# Patient Record
Sex: MALE | Race: WHITE | Employment: OTHER | URBAN - METROPOLITAN AREA
[De-identification: names, ages, dates, MRNs, and addresses within clinical notes are randomized per-mention and may not be internally consistent; named-entity substitution may affect disease eponyms.]

---

## 2017-03-21 ENCOUNTER — HOSPITAL ENCOUNTER (OUTPATIENT)
Dept: GENERAL RADIOLOGY | Age: 67
Discharge: HOME | End: 2017-03-21
Payer: COMMERCIAL

## 2017-03-21 DIAGNOSIS — R06.02 SHORTNESS OF BREATH: ICD-10-CM

## 2017-03-21 PROCEDURE — 71020 XR CHEST PA LAT: CPT

## 2018-01-12 ENCOUNTER — HOSPITAL ENCOUNTER (OUTPATIENT)
Dept: MRI IMAGING | Age: 68
Discharge: HOME | End: 2018-01-12
Attending: INTERNAL MEDICINE
Payer: COMMERCIAL

## 2018-01-12 DIAGNOSIS — Z80.0 FAMILY HISTORY OF GI TRACT CANCER: ICD-10-CM

## 2018-01-12 DIAGNOSIS — K86.89 DILATED PANCREATIC DUCT: ICD-10-CM

## 2018-01-12 DIAGNOSIS — K76.9 LIVER LESION: ICD-10-CM

## 2018-01-12 DIAGNOSIS — R19.4 CHANGE IN BOWEL HABITS: ICD-10-CM

## 2018-01-12 LAB — CREAT BLD-MCNC: 0.8 MG/DL (ref 0.6–1.3)

## 2018-01-12 PROCEDURE — 74183 MRI ABD W/O CNTR FLWD CNTR: CPT

## 2018-01-12 PROCEDURE — 82565 ASSAY OF CREATININE: CPT

## 2018-01-12 PROCEDURE — 74182 MRI ABDOMEN W/CONTRAST: CPT

## 2018-01-12 PROCEDURE — 74011250636 HC RX REV CODE- 250/636: Performed by: INTERNAL MEDICINE

## 2018-01-12 PROCEDURE — A9577 INJ MULTIHANCE: HCPCS | Performed by: INTERNAL MEDICINE

## 2018-01-12 RX ADMIN — GADOBENATE DIMEGLUMINE 20 ML: 529 INJECTION, SOLUTION INTRAVENOUS at 17:02

## 2018-02-06 ENCOUNTER — ANESTHESIA EVENT (OUTPATIENT)
Dept: ENDOSCOPY | Age: 68
End: 2018-02-06
Payer: MEDICARE

## 2018-02-06 RX ORDER — MYCOPHENOLATE MOFETIL 500 MG/1
1000 TABLET ORAL 2 TIMES DAILY
COMMUNITY

## 2018-02-06 NOTE — ANESTHESIA PREPROCEDURE EVALUATION
Anesthetic History   No history of anesthetic complications            Review of Systems / Medical History  Patient summary reviewed, nursing notes reviewed and pertinent labs reviewed    Pulmonary            Asthma        Neuro/Psych             Comments: Myasthenia Gravis Cardiovascular    Hypertension          Hyperlipidemia    Exercise tolerance: >4 METS     GI/Hepatic/Renal               Comments: Liver Lesion Endo/Other  Within defined limits           Other Findings            Physical Exam    Airway  Mallampati: I  TM Distance: 4 - 6 cm  Neck ROM: normal range of motion   Mouth opening: Normal     Cardiovascular  Regular rate and rhythm,  S1 and S2 normal,  no murmur, click, rub, or gallop             Dental    Dentition: Caps/crowns     Pulmonary  Breath sounds clear to auscultation               Abdominal  GI exam deferred       Other Findings            Anesthetic Plan    ASA: 3  Anesthesia type: total IV anesthesia          Induction: Intravenous  Anesthetic plan and risks discussed with: Patient

## 2018-02-07 ENCOUNTER — ANESTHESIA (OUTPATIENT)
Dept: ENDOSCOPY | Age: 68
End: 2018-02-07
Payer: MEDICARE

## 2018-02-07 ENCOUNTER — HOSPITAL ENCOUNTER (OUTPATIENT)
Age: 68
Setting detail: OUTPATIENT SURGERY
Discharge: HOME | End: 2018-02-07
Attending: INTERNAL MEDICINE | Admitting: INTERNAL MEDICINE
Payer: MEDICARE

## 2018-02-07 VITALS
WEIGHT: 244.13 LBS | SYSTOLIC BLOOD PRESSURE: 124 MMHG | DIASTOLIC BLOOD PRESSURE: 61 MMHG | BODY MASS INDEX: 32.36 KG/M2 | HEIGHT: 73 IN | RESPIRATION RATE: 12 BRPM | OXYGEN SATURATION: 98 % | TEMPERATURE: 98 F | HEART RATE: 71 BPM

## 2018-02-07 PROCEDURE — 74011000250 HC RX REV CODE- 250

## 2018-02-07 PROCEDURE — 76040000019: Performed by: INTERNAL MEDICINE

## 2018-02-07 PROCEDURE — 74011250636 HC RX REV CODE- 250/636

## 2018-02-07 PROCEDURE — 77030019957 HC CUF BLN GASTSCP OCOA -B: Performed by: INTERNAL MEDICINE

## 2018-02-07 PROCEDURE — 77030003406 HC NDL ASPIR BIOP OCOA -C: Performed by: INTERNAL MEDICINE

## 2018-02-07 PROCEDURE — 76060000031 HC ANESTHESIA FIRST 0.5 HR: Performed by: INTERNAL MEDICINE

## 2018-02-07 PROCEDURE — 74011250636 HC RX REV CODE- 250/636: Performed by: INTERNAL MEDICINE

## 2018-02-07 RX ORDER — EPINEPHRINE 0.1 MG/ML
1 INJECTION INTRACARDIAC; INTRAVENOUS
Status: DISCONTINUED | OUTPATIENT
Start: 2018-02-07 | End: 2018-02-07 | Stop reason: HOSPADM

## 2018-02-07 RX ORDER — FENTANYL CITRATE 50 UG/ML
25 INJECTION, SOLUTION INTRAMUSCULAR; INTRAVENOUS
Status: DISCONTINUED | OUTPATIENT
Start: 2018-02-07 | End: 2018-02-07 | Stop reason: HOSPADM

## 2018-02-07 RX ORDER — FLUMAZENIL 0.1 MG/ML
0.2 INJECTION INTRAVENOUS
Status: DISCONTINUED | OUTPATIENT
Start: 2018-02-07 | End: 2018-02-07 | Stop reason: HOSPADM

## 2018-02-07 RX ORDER — ATROPINE SULFATE 0.1 MG/ML
0.5 INJECTION INTRAVENOUS
Status: DISCONTINUED | OUTPATIENT
Start: 2018-02-07 | End: 2018-02-07 | Stop reason: HOSPADM

## 2018-02-07 RX ORDER — SODIUM CHLORIDE 0.9 % (FLUSH) 0.9 %
5-10 SYRINGE (ML) INJECTION EVERY 8 HOURS
Status: DISCONTINUED | OUTPATIENT
Start: 2018-02-07 | End: 2018-02-07 | Stop reason: HOSPADM

## 2018-02-07 RX ORDER — GLYCOPYRROLATE 0.2 MG/ML
INJECTION INTRAMUSCULAR; INTRAVENOUS AS NEEDED
Status: DISCONTINUED | OUTPATIENT
Start: 2018-02-07 | End: 2018-02-07 | Stop reason: HOSPADM

## 2018-02-07 RX ORDER — SODIUM CHLORIDE 0.9 % (FLUSH) 0.9 %
5-10 SYRINGE (ML) INJECTION AS NEEDED
Status: DISCONTINUED | OUTPATIENT
Start: 2018-02-07 | End: 2018-02-07 | Stop reason: HOSPADM

## 2018-02-07 RX ORDER — MIDAZOLAM HYDROCHLORIDE 1 MG/ML
.25-5 INJECTION, SOLUTION INTRAMUSCULAR; INTRAVENOUS
Status: DISCONTINUED | OUTPATIENT
Start: 2018-02-07 | End: 2018-02-07 | Stop reason: HOSPADM

## 2018-02-07 RX ORDER — NALOXONE HYDROCHLORIDE 0.4 MG/ML
0.4 INJECTION, SOLUTION INTRAMUSCULAR; INTRAVENOUS; SUBCUTANEOUS
Status: DISCONTINUED | OUTPATIENT
Start: 2018-02-07 | End: 2018-02-07 | Stop reason: HOSPADM

## 2018-02-07 RX ORDER — MIDAZOLAM HYDROCHLORIDE 1 MG/ML
1-3 INJECTION, SOLUTION INTRAMUSCULAR; INTRAVENOUS
Status: DISCONTINUED | OUTPATIENT
Start: 2018-02-07 | End: 2018-02-07 | Stop reason: HOSPADM

## 2018-02-07 RX ORDER — PROPOFOL 10 MG/ML
INJECTION, EMULSION INTRAVENOUS AS NEEDED
Status: DISCONTINUED | OUTPATIENT
Start: 2018-02-07 | End: 2018-02-07 | Stop reason: HOSPADM

## 2018-02-07 RX ORDER — DEXTROMETHORPHAN/PSEUDOEPHED 2.5-7.5/.8
1.2 DROPS ORAL
Status: DISCONTINUED | OUTPATIENT
Start: 2018-02-07 | End: 2018-02-07 | Stop reason: HOSPADM

## 2018-02-07 RX ORDER — SODIUM CHLORIDE 9 MG/ML
75 INJECTION, SOLUTION INTRAVENOUS CONTINUOUS
Status: DISCONTINUED | OUTPATIENT
Start: 2018-02-07 | End: 2018-02-07 | Stop reason: HOSPADM

## 2018-02-07 RX ORDER — LIDOCAINE HYDROCHLORIDE 20 MG/ML
INJECTION, SOLUTION EPIDURAL; INFILTRATION; INTRACAUDAL; PERINEURAL AS NEEDED
Status: DISCONTINUED | OUTPATIENT
Start: 2018-02-07 | End: 2018-02-07 | Stop reason: HOSPADM

## 2018-02-07 RX ADMIN — PROPOFOL 500 MG: 10 INJECTION, EMULSION INTRAVENOUS at 14:50

## 2018-02-07 RX ADMIN — LIDOCAINE HYDROCHLORIDE 100 MG: 20 INJECTION, SOLUTION EPIDURAL; INFILTRATION; INTRACAUDAL; PERINEURAL at 14:29

## 2018-02-07 RX ADMIN — GLYCOPYRROLATE 0.2 MG: 0.2 INJECTION INTRAMUSCULAR; INTRAVENOUS at 14:29

## 2018-02-07 RX ADMIN — SODIUM CHLORIDE 75 ML/HR: 900 INJECTION, SOLUTION INTRAVENOUS at 13:50

## 2018-02-07 NOTE — DISCHARGE INSTRUCTIONS
Nicole Bolaños  [id number]  1950    EUS DISCHARGE INSTRUCTIONS  Discomfort:  Sore throat- throat lozenges or warm salt water gargle  redness at IV site- apply warm compress to area; if redness or soreness persist- contact your physician  Gaseous discomfort- walking, belching will help relieve any discomfort  You may not operate a vehicle for 12 hours  You may not engage in an occupation involving machinery or appliances for rest of today  You may not drink alcoholic beverages for at least 12 hours  Avoid making any critical decisions for at least 24 hour  DIET  You may have minimal sips at this time-- do not eat or drink for two hours. You may eat and drink after 315pm today  You may resume your regular diet - however -  remember your colon is empty and a heavy meal will produce gas. Avoid these foods:  vegetables, fried / greasy foods, carbonated drinks    MEDICATIONS:        ACTIVITY  You may resume your normal daily activities until tomorrow AM;  Spend the remainder of the day resting -  avoid any strenuous activity. CALL M.D. ANY SIGN OF   Increasing pain, nausea, vomiting  Abdominal distension (swelling)  New increased bleeding (oral or rectal)  Fever (chills)  Pain in chest area  Bloody discharge from nose or mouth  Shortness of breath    IMPRESSION:  -Pancreas divisum,   -No mass noted in pancreas  -Mild pancreatic duct dilatation in distal body and tail of pancreas from 4-5mm with dilated side branches but no associated mass noted proximally or distally. -Main pancreatic duct measures 3-4mm in the head of the pancreas with focal area of secondary ductule cystic dilatation suggestive of side branch dilatation, but not associated with main branch or mass lession. No noted stricture.   The appearance is consistent with a secondary branch (side branch) IPMN    Follow-up Instructions:  -Call Dr. Venu Edward if questions arise regarding your procedure  -Telephone # [phone]  -Repeat MRI imaging in 6-12 months to assess for any change will be coordinated by our office.     Chago Block MD

## 2018-02-07 NOTE — H&P
Gastroenterology Outpatient History and Physical    Patient: Trent Bound    Physician: Jose Gil MD    Chief Complaint: Abnl GI x-ray with pancreatic cyst  History of Present Illness: 74yo M with Abnl GI x-ray with pancreatic cyst.  Pancreatic tail calcification and minimal ductal dilatation. EUS in past with pancreatic divisum. No cyst noted then. History:  Past Medical History:   Diagnosis Date    Adverse effect of anesthesia     have to be particular with type of anesthesia d/t myasthenia gravis    Asthma     Elevated cholesterol     Hypertension     Myasthenia gravis ([address]     Seasonal allergic rhinitis       Past Surgical History:   Procedure Laterality Date    HX COLONOSCOPY      HX HERNIA REPAIR      HX ORTHOPAEDIC Right     RTC     HX ORTHOPAEDIC      tavo knee replacement    HX TONSILLECTOMY      [facility] EDG US EXAM SURGICAL ALTER STOM DUODENUM/JEJUNUM  12/20/2016           Social History     Social History    Marital status:      Spouse name: N/A    Number of children: N/A    Years of education: N/A     Social History Main Topics    Smoking status: Never Smoker    Smokeless tobacco: Never Used    Alcohol use 0.6 - 1.2 oz/week     1 - 2 Glasses of wine per week    Drug use: No    Sexual activity: Not Asked     Other Topics Concern    None     Social History Narrative      Family History   Problem Relation Age of Onset    Cancer Mother      colon    Lung Disease Father      mesothelioma    Heart Disease Father     Diabetes Sister     Colon Cancer Maternal Uncle     There is no problem list on file for this patient. Allergies: Allergies   Allergen Reactions    Tape [Adhesive] Rash     Medications:   Prior to Admission medications    Medication Sig Start Date End Date Taking? Authorizing Provider   mycophenolate (CELLCEPT) 500 mg tablet Take 1,000 mg by mouth two (2) times a day.    Yes Historical Provider   lisinopril (PRINIVIL, ZESTRIL) 10 mg tablet Take 10 mg by mouth daily. Yes Historical Provider   hydroCHLOROthiazide (HYDRODIURIL) 25 mg tablet Take 25 mg by mouth daily. Yes Historical Provider   atorvastatin (LIPITOR) 10 mg tablet Take 10 mg by mouth daily. Yes Historical Provider   Cetirizine (ZYRTEC) 10 mg cap Take  by mouth daily. Yes Historical Provider   benzonatate (TESSALON) 200 mg capsule Take 200 mg by mouth three (3) times daily as needed for Cough. Yes Historical Provider   predniSONE (DELTASONE) 10 mg tablet Take 10 mg by mouth daily (with breakfast). Yes Historical Provider   azelastine-fluticasone (DYMISTA) 137-50 mcg/spray spry by Nasal route as needed. Historical Provider   budesonide (PULMICORT FLEXHALER) 90 mcg/actuation aepb inhaler Take  by inhalation two (2) times a day. Historical Provider   albuterol (VENTOLIN HFA) 90 mcg/actuation inhaler Take 2 Puffs by inhalation every four (4) hours as needed. Historical Provider     Physical Exam:   Vital Signs: Blood pressure 126/86, pulse 74, temperature 98.4 °F (36.9 °C), resp. rate 18, height 6' 1\" (1.854 m), weight 110.7 kg (244 lb 2 oz), SpO2 98 %.   General: well developed, well nourished   HEENT: unremarkable   Heart: regular rhythm no mumur    Lungs: clear   Abdominal:  benign   Neurological: unremarkable   Extremities: no edema     Findings/Diagnosis: Abnl GI x-ray with pancreatic cyst  Plan of Care/Planned Procedure: EUS-FNA with conscious/deep sedation    Signed:  Bo Giraldo MD 2/7/2018

## 2018-02-07 NOTE — PERIOP NOTES
Anesthesia reports 500mg Propofol, 100mg Lidocaine and 0.2mg Robinul 500mL NS given during procedure. Received report from anesthesia staff on vital signs and status of patient.

## 2018-02-07 NOTE — IP AVS SNAPSHOT
[address] [facility] 
[phone] Patient: Roman Brown 
MRN: [id number] MBG:3/30/1714 About your hospitalization You were admitted on:  February 7, 2018 You last received care in the:  \[facility]\"" ENDOSCOPY You were discharged on:  February 7, 2018 Why you were hospitalized Your primary diagnosis was:  Not on File Follow-up Information None Your Scheduled Appointments Wednesday March 07, 2018 COLONOSCOPY with MD SANDEE Ruby ENDOSCOPY (RI OR PRE ASSESSMENT) [address] [facility]  
[phone] Discharge Orders None A check lilian indicates which time of day the medication should be taken. My Medications CONTINUE taking these medications Instructions Each Dose to Equal  
 Morning Noon Evening Bedtime  
 benzonatate 200 mg capsule Commonly known as:  TESSALON Your last dose was: Your next dose is: Take 200 mg by mouth three (3) times daily as needed for Cough. 200 mg CELLCEPT 500 mg tablet Generic drug:  mycophenolate Your last dose was: Your next dose is: Take 1,000 mg by mouth two (2) times a day. 1000 mg DYMISTA 137-50 mcg/spray [place] Generic drug:  azelastine-fluticasone Your last dose was: Your next dose is:    
   
   
 by Nasal route as needed. hydroCHLOROthiazide 25 mg tablet Commonly known as:  HYDRODIURIL Your last dose was: Your next dose is: Take 25 mg by mouth daily. 25 mg  
    
   
   
   
  
 LIPITOR 10 mg tablet Generic drug:  atorvastatin Your last dose was: Your next dose is: Take 10 mg by mouth daily. 10 mg  
    
   
   
   
  
 lisinopril 10 mg tablet Commonly known as:  Sue Anand  
 Your last dose was: Your next dose is: Take 10 mg by mouth daily. 10 mg  
    
   
   
   
  
 predniSONE 10 mg tablet Commonly known as:  Daren Lard Your last dose was: Your next dose is: Take 10 mg by mouth daily (with breakfast). 10 mg PULMICORT FLEXHALER 90 mcg/actuation Aepb inhaler Generic drug:  budesonide Your last dose was: Your next dose is: Take  by inhalation two (2) times a day. VENTOLIN HFA 90 mcg/actuation inhaler Generic drug:  albuterol Your last dose was: Your next dose is: Take 2 Puffs by inhalation every four (4) hours as needed. 2 Puff ZyrTEC 10 mg Cap Generic drug:  Cetirizine Your last dose was: Your next dose is: Take  by mouth daily. Discharge Instructions Jacob Rangel 
[id number] 1950 EUS DISCHARGE INSTRUCTIONS Discomfort: 
Sore throat- throat lozenges or warm salt water gargle 
redness at IV site- apply warm compress to area; if redness or soreness persist- contact your physician Gaseous discomfort- walking, belching will help relieve any discomfort You may not operate a vehicle for 12 hours You may not engage in an occupation involving machinery or appliances for rest of today You may not drink alcoholic beverages for at least 12 hours Avoid making any critical decisions for at least 24 hour DIET You may have minimal sips at this time-- do not eat or drink for two hours. You may eat and drink after 315pm today You may resume your regular diet  however -  remember your colon is empty and a heavy meal will produce gas. Avoid these foods:  vegetables, fried / greasy foods, carbonated drinks MEDICATIONS: 
 
 
 
ACTIVITY You may resume your normal daily activities until tomorrow AM; 
 Spend the remainder of the day resting -  avoid any strenuous activity. CALL M.D. ANY SIGN OF Increasing pain, nausea, vomiting Abdominal distension (swelling) New increased bleeding (oral or rectal) Fever (chills) Pain in chest area Bloody discharge from nose or mouth Shortness of breath IMPRESSION: 
-Pancreas divisum,  
-No mass noted in pancreas 
-Mild pancreatic duct dilatation in distal body and tail of pancreas from 4-5mm with dilated side branches but no associated mass noted proximally or distally. -Main pancreatic duct measures 3-4mm in the head of the pancreas with focal area of secondary ductule cystic dilatation suggestive of side branch dilatation, but not associated with main branch or mass lession. No noted stricture. The appearance is consistent with a secondary branch (side branch) IPMN Follow-up Instructions: 
-Call Dr. Pablito Gomez if questions arise regarding your procedure 
-Telephone # [phone] 
-Repeat MRI imaging in 6-12 months to assess for any change will be coordinated by our office. Love Diaz MD 
 
  
 
  
  
  
Introducing [facility] SERVICES! Dear Dorothy Saunders: Thank you for requesting a [employer] account. Our records indicate that you already have an active [employer] account. You can access your account anytime at [email or web address] Did you know that you can access your hospital and ER discharge instructions at any time in [employer]? You can also review all of your test results from your hospital stay or ER visit. Additional Information If you have questions, please visit the Frequently Asked Questions section of the [employer] website at [email or web address] Remember, [employer] is NOT to be used for urgent needs. For medical emergencies, dial 911. Now available from your iPhone and Android! Providers Seen During Your Hospitalization Provider Specialty Primary office phone Harmony Faulkner MD Gastroenterology [phone] Your Primary Care Physician (PCP) Primary Care Physician Office Phone Office Fax [address] [phone] You are allergic to the following Allergen Reactions Tape (Adhesive) Rash Recent Documentation Height Weight BMI Smoking Status 1.854 m 110.7 kg 32.21 kg/m2 Never Smoker Emergency Contacts Name Discharge Info Relation Home Work Mobile Kathrin Martines DISCHARGE CAREGIVER [3] Spouse [3] [phone] Patient Belongings The following personal items are in your possession at time of discharge: 
  Dental Appliances: None  Visual Aid: Glasses Please provide this summary of care documentation to your next provider. Signatures-by signing, you are acknowledging that this After Visit Summary has been reviewed with you and you have received a copy. Patient Signature:  ____________________________________________________________ Date:  ____________________________________________________________  
  
Tayla Ramírez Provider Signature:  ____________________________________________________________ Date:  ____________________________________________________________

## 2018-02-07 NOTE — ANESTHESIA POSTPROCEDURE EVALUATION
Post-Anesthesia Evaluation and Assessment    Patient: Christiano Busby MRN: [id number]  SSN: [id number]    YOB: 1950  Age: 78 y.o. Sex: male       Cardiovascular Function/Vital Signs  Visit Vitals    /61    Pulse 71    Temp 36.7 °C (98 °F)    Resp 12    Ht 6' 1\" (1.854 m)    Wt 110.7 kg (244 lb 2 oz)    SpO2 98%    BMI 32.21 kg/m2       Patient is status post total IV anesthesia anesthesia for Procedure(s):  ENDOSCOPIC ULTRASOUND (EUS). Nausea/Vomiting: None    Postoperative hydration reviewed and adequate. Pain:  Pain Scale 1: Numeric (0 - 10) (02/07/18 1519)  Pain Intensity 1: 0 (02/07/18 1519)   Managed    Neurological Status: At baseline    Mental Status and Level of Consciousness: Arousable    Pulmonary Status:   O2 Device: Room air (02/07/18 1519)   Adequate oxygenation and airway patent    Complications related to anesthesia: None    Post-anesthesia assessment completed.  No concerns    Signed By: Bashir Knowles DO     February 7, 2018

## 2018-02-07 NOTE — PROCEDURES
NAME:  Celso Purvis   :   1950   MRN:   [id number]     [facility]    Date/Time:  2018   Procedure Type: EUS (radial and linear)    Indications: Abnormal CT scan showing pancreatic duct abnormality     Pre-operative Diagnosis: see indication above  Post-operative Diagnosis:  See findings below     Primary GI: Nikunj Martinez MD  : Yun Cordero MD  Referring Provider:      Erica Purvis MD     Procedure Details:    Exam:  Airway: clear, no airway problems anticipated  Heart: RRR, without gallops or rubs  Lungs: clear bilaterally without wheezes, crackles, or rhonchi  Abdomen: soft, nontender, nondistended, bowel sounds present  Mental Status: awake, alert and oriented to person, place and time      Anethesia/Sedation:  MAC anesthesia Propofol 500mg IV    Procedure Details   After infom consent was obtained for the procedure, with all risks and benefits of procedure explained the patient was taken to the endoscopy suite and placed in the left lateral decubitus position. Following sequential administration of sedation as per above, the radial, followed by th linear, echoendoscope was inserted into the mouth and advanced under direct vision to second portion of the duodenum. A careful inspection was made as the gastroscope was withdrawn, including a retroflexed view of the proximal stomach; findings and interventions are described below.                   Findings:      Endoscopic:-normal esophagus, stomach, and duodenum     Ultrasound:                        Esophagus: normal findings                        Stomach: normal findings                        Pancreas:                                               Areas examined: the entire gland                                              Parenchyma: -pancreas divisum, -no mass noted in pancreas                                              Pancreatic Duct: mild pancreatic duct dilatation in distal body and tail of pancreas from 4-5mm with dilated side branches with no associated mass noted proximally or distally. The pancreatic duct is 3-4mm in the head of the pancreas with focal area of secondary ductule cystic dilatation suggestive of side branch dilatation as not associated with main branch or mass lession. No noted with stricture. Bile Duct: normal common bile duct                        Lymph Node: no adenopathy          Specimen Removed:  None  Complications: None. EBL:  None. Interventions: none     Impressions:   -Pancreas divisum,   -No mass noted in pancreas  -Mild pancreatic duct dilatation in distal body and tail of pancreas from 4-5mm with dilated side branches but no associated mass noted proximally or distally. -Main pancreatic duct measures 3-4mm in the head of the pancreas with focal area of secondary ductule cystic dilatation suggestive of side branch dilatation, but not associated with main branch or mass lession. No noted stricture. The appearance is consistent with a secondary branch (side branch) IPMN     Recommendations: Repeat MRI imaging in 6-12 months to assess for any change will be coordinated by our office.     Elise Ferrer MD

## 2018-02-07 NOTE — ROUTINE PROCESS
Roman Brown  1950  [id number]    Situation:  Verbal report received from: Tash Yeung RN  Procedure: Procedure(s):  ENDOSCOPIC ULTRASOUND (EUS)    Background:    Preoperative diagnosis: LIVER LESION  Postoperative diagnosis: Abnormal CT    :  Dr. Maddy Coe  Assistant(s): Endoscopy Technician-1: Georgi Rodriguez  Endoscopy RN-1: Nobie Habermann, RN  Endoscopy RN-2: Hassan Goodell, RN    Specimens: * No specimens in log *  H. Pylori  no    Assessment:  Intra-procedure medications       Anesthesia gave intra-procedure sedation and medications, see anesthesia flow sheet yes    Intravenous fluids: NS@ KVO     Vital signs stable       Abdominal assessment: round and soft       Recommendation:  Discharge patient per MD order  .     Family or Friend  Rohini Roberts WIfe  Permission to share finding with family or friend yes

## 2018-03-07 ENCOUNTER — ANESTHESIA (OUTPATIENT)
Dept: ENDOSCOPY | Age: 68
End: 2018-03-07
Payer: MEDICARE

## 2018-03-07 ENCOUNTER — HOSPITAL ENCOUNTER (OUTPATIENT)
Age: 68
Setting detail: OUTPATIENT SURGERY
Discharge: HOME | End: 2018-03-07
Attending: INTERNAL MEDICINE | Admitting: INTERNAL MEDICINE
Payer: MEDICARE

## 2018-03-07 ENCOUNTER — ANESTHESIA EVENT (OUTPATIENT)
Dept: ENDOSCOPY | Age: 68
End: 2018-03-07
Payer: MEDICARE

## 2018-03-07 VITALS
HEART RATE: 64 BPM | BODY MASS INDEX: 32.6 KG/M2 | DIASTOLIC BLOOD PRESSURE: 74 MMHG | HEIGHT: 73 IN | WEIGHT: 246 LBS | SYSTOLIC BLOOD PRESSURE: 123 MMHG | TEMPERATURE: 97.7 F | RESPIRATION RATE: 11 BRPM | OXYGEN SATURATION: 99 %

## 2018-03-07 PROCEDURE — 74011250637 HC RX REV CODE- 250/637: Performed by: ANESTHESIOLOGY

## 2018-03-07 PROCEDURE — 76040000019: Performed by: INTERNAL MEDICINE

## 2018-03-07 PROCEDURE — 77030009426 HC FCPS BIOP ENDOSC BSC -B: Performed by: INTERNAL MEDICINE

## 2018-03-07 PROCEDURE — 88305 TISSUE EXAM BY PATHOLOGIST: CPT | Performed by: INTERNAL MEDICINE

## 2018-03-07 PROCEDURE — 74011250636 HC RX REV CODE- 250/636: Performed by: INTERNAL MEDICINE

## 2018-03-07 PROCEDURE — 74011000250 HC RX REV CODE- 250

## 2018-03-07 PROCEDURE — 76060000031 HC ANESTHESIA FIRST 0.5 HR: Performed by: INTERNAL MEDICINE

## 2018-03-07 PROCEDURE — 74011250636 HC RX REV CODE- 250/636

## 2018-03-07 PROCEDURE — 77030010936 HC CLP LIG BSC -C: Performed by: INTERNAL MEDICINE

## 2018-03-07 RX ORDER — NALOXONE HYDROCHLORIDE 0.4 MG/ML
0.4 INJECTION, SOLUTION INTRAMUSCULAR; INTRAVENOUS; SUBCUTANEOUS
Status: DISCONTINUED | OUTPATIENT
Start: 2018-03-07 | End: 2018-03-07 | Stop reason: HOSPADM

## 2018-03-07 RX ORDER — SODIUM CHLORIDE 0.9 % (FLUSH) 0.9 %
5-10 SYRINGE (ML) INJECTION EVERY 8 HOURS
Status: DISCONTINUED | OUTPATIENT
Start: 2018-03-07 | End: 2018-03-07 | Stop reason: HOSPADM

## 2018-03-07 RX ORDER — SODIUM CHLORIDE 0.9 % (FLUSH) 0.9 %
5-10 SYRINGE (ML) INJECTION AS NEEDED
Status: DISCONTINUED | OUTPATIENT
Start: 2018-03-07 | End: 2018-03-07 | Stop reason: HOSPADM

## 2018-03-07 RX ORDER — EPINEPHRINE 0.1 MG/ML
1 INJECTION INTRACARDIAC; INTRAVENOUS
Status: DISCONTINUED | OUTPATIENT
Start: 2018-03-07 | End: 2018-03-07 | Stop reason: HOSPADM

## 2018-03-07 RX ORDER — LIDOCAINE HYDROCHLORIDE 20 MG/ML
INJECTION, SOLUTION EPIDURAL; INFILTRATION; INTRACAUDAL; PERINEURAL AS NEEDED
Status: DISCONTINUED | OUTPATIENT
Start: 2018-03-07 | End: 2018-03-07 | Stop reason: HOSPADM

## 2018-03-07 RX ORDER — SODIUM CHLORIDE 9 MG/ML
100 INJECTION, SOLUTION INTRAVENOUS CONTINUOUS
Status: DISCONTINUED | OUTPATIENT
Start: 2018-03-07 | End: 2018-03-07 | Stop reason: HOSPADM

## 2018-03-07 RX ORDER — FLUMAZENIL 0.1 MG/ML
0.2 INJECTION INTRAVENOUS
Status: DISCONTINUED | OUTPATIENT
Start: 2018-03-07 | End: 2018-03-07 | Stop reason: HOSPADM

## 2018-03-07 RX ORDER — ATROPINE SULFATE 0.1 MG/ML
0.5 INJECTION INTRAVENOUS
Status: DISCONTINUED | OUTPATIENT
Start: 2018-03-07 | End: 2018-03-07 | Stop reason: HOSPADM

## 2018-03-07 RX ORDER — DEXTROMETHORPHAN/PSEUDOEPHED 2.5-7.5/.8
1.2 DROPS ORAL
Status: DISCONTINUED | OUTPATIENT
Start: 2018-03-07 | End: 2018-03-07 | Stop reason: HOSPADM

## 2018-03-07 RX ORDER — PROPOFOL 10 MG/ML
INJECTION, EMULSION INTRAVENOUS AS NEEDED
Status: DISCONTINUED | OUTPATIENT
Start: 2018-03-07 | End: 2018-03-07 | Stop reason: HOSPADM

## 2018-03-07 RX ORDER — MIDAZOLAM HYDROCHLORIDE 1 MG/ML
1-3 INJECTION, SOLUTION INTRAMUSCULAR; INTRAVENOUS
Status: DISCONTINUED | OUTPATIENT
Start: 2018-03-07 | End: 2018-03-07 | Stop reason: HOSPADM

## 2018-03-07 RX ADMIN — PROPOFOL 150 MG: 10 INJECTION, EMULSION INTRAVENOUS at 11:50

## 2018-03-07 RX ADMIN — SODIUM CHLORIDE 100 ML/HR: 900 INJECTION, SOLUTION INTRAVENOUS at 11:18

## 2018-03-07 RX ADMIN — SIMETHICONE 80 MG: 20 SUSPENSION/ DROPS ORAL at 11:42

## 2018-03-07 RX ADMIN — LIDOCAINE HYDROCHLORIDE 40 MG: 20 INJECTION, SOLUTION EPIDURAL; INFILTRATION; INTRACAUDAL; PERINEURAL at 11:33

## 2018-03-07 NOTE — DISCHARGE INSTRUCTIONS
Jake Rashid MD  [facility], [address]  [place], [address] [facility]  [phone]  [email or web address]    Christiano Busby  [id number]  1950    COLON DISCHARGE INSTRUCTIONS  Discomfort:  Redness at IV site- apply warm compress to area; if redness or soreness persist- contact your physician  There may be a slight amount of blood passed from the rectum  Gaseous discomfort- walking, belching will help relieve any discomfort  You may not operate a vehicle for 12 hours  You may not engage in an occupation involving machinery or appliances for rest of today  You may not drink alcoholic beverages for at least 12 hours  Avoid making any critical decisions for at least 24 hour  DIET:   Regular diet. - however -  remember your colon is empty and a heavy meal will produce gas. Avoid these foods:  vegetables, fried / greasy foods, carbonated drinks for today      ACTIVITY:  You may resume your normal daily activities it is recommended that you spend the remainder of the day resting -  avoid any strenuous activity. CALL M.D. ANY SIGN OF:   Increasing pain, nausea, vomiting  Abdominal distension (swelling)  New increased bleeding (oral or rectal)  Fever (chills)  Pain in chest area  Bloody discharge from nose or mouth  Shortness of breath     COLONOSCOPY FINDINGS:  Your colonoscopy showed: NO polyps found. Do have diverticulosis and internal hemorrhoids. Follow-up Instructions:   Call Dr. Jake Rashid if any questions or problems. Telephone # [phone]  Dr. Rodríguez [facility] office will notify you of the biopsy results within 7 to 10 days. Should have a repeat colonoscopy in 1 year. [employer] Activation    Thank you for requesting access to [employer]. Please follow the instructions below to securely access and download your online medical record.  [employer] allows you to send messages to your doctor, view your test results, renew your prescriptions, schedule appointments, and more. How Do I Sign Up? 1. In your internet browser, go to [email or web address]  2. Click on the First Time User? Click Here link in the Sign In box. You will be redirect to the New Member Sign Up page. 3. Enter your [employer] People Access Code exactly as it appears below. You will not need to use this code after youve completed the sign-up process. If you do not sign up before the expiration date, you must request a new code. MyChart Access Code: Activation code not generated  Current [employer] People Status: Active (This is the date your "[employer]t access code will )    4. Enter the last four digits of your Social Security Number (xxxx) and Date of Birth (mm/dd/yyyy) as indicated and click Submit. You will be taken to the next sign-up page. 5. Create a Good People ID. This will be your [employer] People login ID and cannot be changed, so think of one that is secure and easy to remember. 6. Create a [employer] People password. You can change your password at any time. 7. Enter your Password Reset Question and Answer. This can be used at a later time if you forget your password. 8. Enter your e-mail address. You will receive e-mail notification when new information is available in [address] 9. Click Sign Up. You can now view and download portions of your medical record. 10. Click the Download Summary menu link to download a portable copy of your medical information. Additional Information    If you have questions, please visit the Frequently Asked Questions section of the [employer] website at [email or web address] Remember, [employer] is NOT to be used for urgent needs. For medical emergencies, dial 911.

## 2018-03-07 NOTE — ANESTHESIA POSTPROCEDURE EVALUATION
Post-Anesthesia Evaluation and Assessment    Patient: Annie Ocasio MRN: [id number]  SSN: [id number]    YOB: 1950  Age: 78 y.o. Sex: male       Cardiovascular Function/Vital Signs  Visit Vitals    /75    Pulse 68    Temp 36.5 °C (97.7 °F)    Resp 16    Ht 6' 1\" (1.854 m)    Wt 111.6 kg (246 lb)    SpO2 98%    BMI 32.46 kg/m2       Patient is status post general, total IV anesthesia anesthesia for Procedure(s):  COLONOSCOPY  COLON BIOPSY  RESOLUTION CLIP. Nausea/Vomiting: None    Postoperative hydration reviewed and adequate. Pain:  Pain Scale 1: Numeric (0 - 10) (03/07/18 1200)  Pain Intensity 1: 0 (03/07/18 1200)   Managed    Neurological Status: At baseline    Mental Status and Level of Consciousness: Arousable    Pulmonary Status:   O2 Device: Room air (03/07/18 1200)   Adequate oxygenation and airway patent    Complications related to anesthesia: None    Post-anesthesia assessment completed.  No concerns    Signed By: Hermelinda Salazar MD     March 7, 2018

## 2018-03-07 NOTE — IP AVS SNAPSHOT
[address] [facility] 
[phone] Patient: Luz Kolb 
MRN: [id number] PII:[id number] About your hospitalization You were admitted on:  March 7, 2018 You last received care in the:  [facility] ENDOSCOPY You were discharged on:  March 7, 2018 Why you were hospitalized Your primary diagnosis was:  Not on File Follow-up Information Follow up With Details Comments Contact Info Raz Roche MD   [address] [place] [address] [address] [facility] 
[phone] Discharge Orders None A check lilian indicates which time of day the medication should be taken. My Medications CONTINUE taking these medications Instructions Each Dose to Equal  
 Morning Noon Evening Bedtime  
 benzonatate 200 mg capsule Commonly known as:  TESSALON Your last dose was: Your next dose is: Take 200 mg by mouth three (3) times daily as needed for Cough. 200 mg CELLCEPT 500 mg tablet Generic drug:  mycophenolate Your last dose was: Your next dose is: Take 1,000 mg by mouth two (2) times a day. 1000 mg DYMISTA 137-50 mcg/spray [place] Generic drug:  azelastine-fluticasone Your last dose was: Your next dose is:    
   
   
 by Nasal route as needed. hydroCHLOROthiazide 25 mg tablet Commonly known as:  HYDRODIURIL Your last dose was: Your next dose is: Take 25 mg by mouth daily. 25 mg  
    
   
   
   
  
 LIPITOR 10 mg tablet Generic drug:  atorvastatin Your last dose was: Your next dose is: Take 10 mg by mouth daily. 10 mg  
    
   
   
   
  
 lisinopril 10 mg tablet Commonly known as:  Ritesh Valenzuela Your last dose was: Your next dose is: Take 10 mg by mouth daily. 10 mg  
    
   
   
   
  
 predniSONE 10 mg tablet Commonly known as:  Soraida Hart Your last dose was: Your next dose is: Take 10 mg by mouth daily (with breakfast). 10 mg PULMICORT FLEXHALER 90 mcg/actuation Aepb inhaler Generic drug:  budesonide Your last dose was: Your next dose is: Take  by inhalation two (2) times a day. VENTOLIN HFA 90 mcg/actuation inhaler Generic drug:  albuterol Your last dose was: Your next dose is: Take 2 Puffs by inhalation every four (4) hours as needed. 2 Puff ZyrTEC 10 mg Cap Generic drug:  Cetirizine Your last dose was: Your next dose is: Take  by mouth daily. Discharge Instructions Nicole Villafuerte MD 
[facility], [address] [place] [id number] 
[email or web address] Slim Wright 
[id number] 1950 COLON DISCHARGE INSTRUCTIONS Discomfort: 
Redness at IV site- apply warm compress to area; if redness or soreness persist- contact your physician There may be a slight amount of blood passed from the rectum Gaseous discomfort- walking, belching will help relieve any discomfort You may not operate a vehicle for 12 hours You may not engage in an occupation involving machinery or appliances for rest of today You may not drink alcoholic beverages for at least 12 hours Avoid making any critical decisions for at least 24 hour DIET: 
 Regular diet.  however -  remember your colon is empty and a heavy meal will produce gas. Avoid these foods:  vegetables, fried / greasy foods, carbonated drinks for today ACTIVITY: 
You may resume your normal daily activities it is recommended that you spend the remainder of the day resting -  avoid any strenuous activity. CALL M.D. ANY SIGN OF: Increasing pain, nausea, vomiting Abdominal distension (swelling) New increased bleeding (oral or rectal) Fever (chills) Pain in chest area Bloody discharge from nose or mouth Shortness of breath COLONOSCOPY FINDINGS: 
Your colonoscopy showed: NO polyps found. Do have diverticulosis and internal hemorrhoids. Follow-up Instructions: 
 Call Dr. April Valdez if any questions or problems. Telephone # [phone] Dr. Jesica Dee office will notify you of the biopsy results within 7 to 10 days. Should have a repeat colonoscopy in 1 year. [employer] Activation Thank you for requesting access to [employer]. Please follow the instructions below to securely access and download your online medical record. [employer] allows you to send messages to your doctor, view your test results, renew your prescriptions, schedule appointments, and more. How Do I Sign Up? 1. In your internet browser, go to [email or web address] 
2. Click on the First Time User? Click Here link in the Sign In box. You will be redirect to the New Member Sign Up page. 3. Enter your [employer] Access Code exactly as it appears below. You will not need to use this code after youve completed the sign-up process. If you do not sign up before the expiration date, you must request a new code. [employer] Access Code: Activation code not generated Current [employer] Status: Active (This is the date your [employer] access code will ) 4. Enter the last four digits of your Social Security Number (xxxx) and Date of Birth (mm/dd/yyyy) as indicated and click Submit. You will be taken to the next sign-up page. 5. Create a [employer] ID. This will be your [employer] login ID and cannot be changed, so think of one that is secure and easy to remember. 6. Create a [employer] password. You can change your password at any time. 7. Enter your Password Reset Question and Answer. This can be used at a later time if you forget your password. 8. Enter your e-mail address. You will receive e-mail notification when new information is available in [address] 9. Click Sign Up. You can now view and download portions of your medical record. 10. Click the Download Summary menu link to download a portable copy of your medical information. Additional Information If you have questions, please visit the Frequently Asked Questions section of the [employer] website at [email or web address] Remember, MyChart is NOT to be used for urgent needs. For medical emergencies, dial 911. Introducing [facility]! Dear Syed East: Thank you for requesting a [employer] account. Our records indicate that you already have an active [employer] account. You can access your account anytime at [email or web address] Did you know that you can access your hospital and ER discharge instructions at any time in [employer]? You can also review all of your test results from your hospital stay or ER visit. Additional Information If you have questions, please visit the Frequently Asked Questions section of the [employer] website at [email or web address] Remember, MyChart is NOT to be used for urgent needs. For medical emergencies, dial 911. Now available from your iPhone and Android! Providers Seen During Your Hospitalization Provider Specialty Primary office phone Christal Castro MD Gastroenterology [phone] Your Primary Care Physician (PCP) Primary Care Physician Office Phone Office Fax Makenzie Talavera, [address] [phone] You are allergic to the following Allergen Reactions Tape (Adhesive) Rash Recent Documentation Height Weight BMI Smoking Status 1.854 m 111.6 kg 32.46 kg/m2 Never Smoker Emergency Contacts Name Discharge Info Relation Home Work Mobile Kathrin Martines DISCHARGE CAREGIVER [3] Spouse [3] [phone] Patient Belongings The following personal items are in your possession at time of discharge: 
  Dental Appliances: None  Visual Aid: Glasses Please provide this summary of care documentation to your next provider. Signatures-by signing, you are acknowledging that this After Visit Summary has been reviewed with you and you have received a copy. Patient Signature:  ____________________________________________________________ Date:  ____________________________________________________________  
  
Bruning Phenes Provider Signature:  ____________________________________________________________ Date:  ____________________________________________________________

## 2018-03-07 NOTE — PROCEDURES
[facility]                  Colonoscopy Operative Report    3/7/2018      Jacob Rangel  [id number]  1950    Procedure Type:   Colonoscopy --diagnostic     Indications:    Change in bowel habits, Diarrhea, Family history of coloretal cancer (screening only)     Pre-operative Diagnosis: see indication above    Post-operative Diagnosis:  See findings below    :  Miriam Garza MD    Referring Provider: Cirilo Carmichael MD      Sedation:  MAC anesthesia Propofol    Pre-Procedural Exam:      Airway: clear,  No airway problems anticipated  Heart: RRR, without gallops or rubs  Lungs: clear bilaterally without wheezes, crackles, or rhonchi  Abdomen: soft, nontender, nondistended, bowel sounds present  Mental Status: awake, alert and oriented to person, place and time     Procedure Details:  After informed consent was obtained with all risks and benefits of procedure explained and preoperative exam completed, the patient was taken to the endoscopy suite and placed in the left lateral decubitus position. Upon sequential sedation as per above, a digital rectal exam was performed . The Olympus videocolonoscope  was inserted in the rectum and carefully advanced to the cecum, which was identified by the ileocecal valve and appendiceal orifice. The cecum was identified by the ileocecal valve and appendiceal orifice. The quality of preparation was adequate. The colonoscope was slowly withdrawn with careful evaluation between folds. Retroflexion in the rectum was completed demonstrating internal hemorrhoids. Findings:   Rectum: Grade 1 internal hemorrhoid(s); Sigmoid:     - Diverticulosis  Descending Colon: normal, biopsied. One biopsy site continued to bleed so clip was placed with complete hemostasis. Transverse Colon: normal  Ascending Colon: normal, biopsied  Cecum: normal  Terminal Ileum: not intubated      Specimen Removed:  none    Complications: None. EBL:  None. Impression:    1. NO polyps found  2. Left sided diverticulosis  3. Internal hemorrhoids    Recommendations: --Await pathology. , -Repeat colonoscopy in 1 year due to high liklihood of Miller syndrome in his family. Regular diet. Resume normal medication(s). Discharge Disposition:  Home in the company of a  when able to ambulate. Carol Calvillo MD    3/7/2018     RICHIE Mills MD  [facility], [address] [place] [address]  [phone]  [email or web address]

## 2018-03-07 NOTE — H&P
Juan Gil MD  [facility], [address]  [phone]  [email or web address]      Gastroenterology Outpatient History and Physical    Patient: Jessica Shah    Physician: Andreina Freeman MD    Vital Signs: Blood pressure 131/82, pulse 68, temperature 97.7 °F (36.5 °C), resp. rate 16, height 6' 1\" (1.854 m), weight 111.6 kg (246 lb), SpO2 100 %. Allergies: Allergies   Allergen Reactions    Tape [Adhesive] Rash       Chief Complaint: change in bowels    History of Present Illness: See OV note. Change in bowel habits. Strong FH of colon cancer.     History:  Past Medical History:   Diagnosis Date    Adverse effect of anesthesia     have to be particular with type of anesthesia d/t myasthenia gravis    Asthma     Elevated cholesterol     Hypertension     Myasthenia gravis ([address]     Seasonal allergic rhinitis       Past Surgical History:   Procedure Laterality Date    HX COLONOSCOPY      HX HERNIA REPAIR      HX ORTHOPAEDIC Right     RTC     HX ORTHOPAEDIC      tavo knee replacement    HX TONSILLECTOMY      IN EDG US EXAM SURGICAL ALTER STOM DUODENUM/JEJUNUM  12/20/2016         IN ENDOSCOPIC ULTRASOUND EXAM  2/7/2018           Social History     Social History    Marital status:      Spouse name: N/A    Number of children: N/A    Years of education: N/A     Social History Main Topics    Smoking status: Never Smoker    Smokeless tobacco: Never Used    Alcohol use 0.6 - 1.2 oz/week     1 - 2 Glasses of wine per week    Drug use: No    Sexual activity: Not Asked     Other Topics Concern    None     Social History Narrative      Family History   Problem Relation Age of Onset    Cancer Mother      colon    Lung Disease Father      mesothelioma    Heart Disease Father     Diabetes Sister     Colon Cancer Maternal Uncle     There is no problem list on file for this patient. Medications:   Prior to Admission medications    Medication Sig Start Date End Date Taking? Authorizing Provider   mycophenolate (CELLCEPT) 500 mg tablet Take 1,000 mg by mouth two (2) times a day. Yes Historical Provider   lisinopril (PRINIVIL, ZESTRIL) 10 mg tablet Take 10 mg by mouth daily. Yes Historical Provider   hydroCHLOROthiazide (HYDRODIURIL) 25 mg tablet Take 25 mg by mouth daily. Yes Historical Provider   atorvastatin (LIPITOR) 10 mg tablet Take 10 mg by mouth daily. Yes Historical Provider   Cetirizine (ZYRTEC) 10 mg cap Take  by mouth daily. Yes Historical Provider   azelastine-fluticasone (DYMISTA) 137-50 mcg/spray spry by Nasal route as needed. Yes Historical Provider   albuterol (VENTOLIN HFA) 90 mcg/actuation inhaler Take 2 Puffs by inhalation every four (4) hours as needed. Yes Historical Provider   predniSONE (DELTASONE) 10 mg tablet Take 10 mg by mouth daily (with breakfast). Yes Historical Provider   budesonide (PULMICORT FLEXHALER) 90 mcg/actuation aepb inhaler Take  by inhalation two (2) times a day. Historical Provider   benzonatate (TESSALON) 200 mg capsule Take 200 mg by mouth three (3) times daily as needed for Cough.     Historical Provider       Physical Exam:     General: well developed, well nourished   HEENT: unremarkable   Heart: regular rhythm no mumur    Lungs: clear   Abdominal:  benign   Neurological: unremarkable   Extremities: no edema     Findings/Diagnosis: change in bowel habits    Plan of Care/Planned Procedure: COlonoscopy  with  monitored anesthesia care sedation       Signed:  Emily Mena MD 3/7/2018

## 2018-03-07 NOTE — ANESTHESIA PREPROCEDURE EVALUATION
Anesthetic History   No history of anesthetic complications            Review of Systems / Medical History  Patient summary reviewed, nursing notes reviewed and pertinent labs reviewed    Pulmonary            Asthma : well controlled       Neuro/Psych   Within defined limits           Cardiovascular    Hypertension: well controlled          Hyperlipidemia    Exercise tolerance: >4 METS     GI/Hepatic/Renal               Comments: Change in bowel habits  Liver lesion - stable for 10 years (per patient)  FH of GI cancer Endo/Other        Obesity     Other Findings   Comments: Myasthenia Gravis         Physical Exam    Airway  Mallampati: II  TM Distance: > 6 cm  Neck ROM: normal range of motion   Mouth opening: Normal     Cardiovascular  Regular rate and rhythm,  S1 and S2 normal,  no murmur, click, rub, or gallop             Dental         Pulmonary  Breath sounds clear to auscultation               Abdominal  GI exam deferred       Other Findings            Anesthetic Plan    ASA: 3  Anesthesia type: general and total IV anesthesia          Induction: Intravenous  Anesthetic plan and risks discussed with: Patient

## 2018-03-07 NOTE — ROUTINE PROCESS
Antwan Olsen  1950  [id number]    Situation:  Verbal report received from: Bertha Carrera RN  Procedure: Procedure(s):  COLONOSCOPY  COLON BIOPSY  RESOLUTION CLIP    Background:    Preoperative diagnosis: CHANGE IN BOWEL HABITS, LIVER LESION, FAMILY HX OF GI TRACT CANCER   Postoperative diagnosis: Diarrhea, diverticulosis, hemorrhoids    :  Dr. Carly Garcia  Assistant(s): Endoscopy Technician-1: Una Montes  Endoscopy RN-1: Nemo Paulson RN    Specimens:   ID Type Source Tests Collected by Time Destination   1 : Ascending Colon bx Preservative Colon, Ascending  Philip Reyes MD 3/7/2018 1142 Pathology   2 : Descending Colon bx Preservative Colon, Descending  Philip Reyes MD 3/7/2018 1146 Pathology     H. Pylori  no    Assessment:  Intra-procedure medications       Anesthesia gave intra-procedure sedation and medications, see anesthesia flow sheet yes    Intravenous fluids: NS@ KVO     Vital signs stable       Abdominal assessment: round and soft       Recommendation:  Discharge patient per MD order  .     Family or Friend Elgie Hams WIfe  Permission to share finding with family or friend yes

## 2018-04-16 ENCOUNTER — HOSPITAL ENCOUNTER (OUTPATIENT)
Dept: GENERAL RADIOLOGY | Age: 68
Discharge: HOME | End: 2018-04-16
Payer: MEDICARE

## 2018-04-16 DIAGNOSIS — J92.9 THICKENING OF PLEURA: ICD-10-CM

## 2018-04-16 PROCEDURE — 71046 X-RAY EXAM CHEST 2 VIEWS: CPT

## 2018-12-02 ENCOUNTER — OFFICE VISIT (OUTPATIENT)
Dept: URGENT CARE | Age: 68
End: 2018-12-02

## 2018-12-02 VITALS
HEART RATE: 81 BPM | TEMPERATURE: 97.6 F | RESPIRATION RATE: 16 BRPM | WEIGHT: 242 LBS | BODY MASS INDEX: 32.07 KG/M2 | SYSTOLIC BLOOD PRESSURE: 143 MMHG | HEIGHT: 73 IN | OXYGEN SATURATION: 95 % | DIASTOLIC BLOOD PRESSURE: 83 MMHG

## 2018-12-02 DIAGNOSIS — J01.10 ACUTE FRONTAL SINUSITIS, RECURRENCE NOT SPECIFIED: Primary | ICD-10-CM

## 2018-12-02 RX ORDER — AMOXICILLIN AND CLAVULANATE POTASSIUM 875; 125 MG/1; MG/1
1 TABLET, FILM COATED ORAL 2 TIMES DAILY
Qty: 20 TAB | Refills: 0 | Status: SHIPPED | OUTPATIENT
Start: 2018-12-02 | End: 2018-12-12

## 2018-12-02 RX ORDER — BENZONATATE 200 MG/1
200 CAPSULE ORAL
Qty: 30 CAP | Refills: 0 | Status: SHIPPED | OUTPATIENT
Start: 2018-12-02 | End: 2018-12-09

## 2018-12-02 RX ORDER — BUDESONIDE AND FORMOTEROL FUMARATE DIHYDRATE 160; 4.5 UG/1; UG/1
2 AEROSOL RESPIRATORY (INHALATION)
COMMUNITY

## 2018-12-02 NOTE — PROGRESS NOTES
Reports 8 days of sinus pressure and pain. There has been some thick yellow nasal discharge. H/o Mild Asthma for which he uses an inhaler 2-3 times per year. Last antibiotic use for sinus infection was March 2018. Past Medical History:   Diagnosis Date    Adverse effect of anesthesia     have to be particular with type of anesthesia d/t myasthenia gravis    Asthma     Elevated cholesterol     Hypertension     Myasthenia gravis ([address]     Seasonal allergic rhinitis         Past Surgical History:   Procedure Laterality Date    COLONOSCOPY N/A 3/7/2018    COLONOSCOPY performed by Doyle Varma MD at [facility] ENDOSCOPY    COLONOSCOPY,DIAGNOSTIC  3/7/2018         HX COLONOSCOPY      HX HERNIA REPAIR      HX ORTHOPAEDIC Right     RTC     HX ORTHOPAEDIC      tavo knee replacement    HX TONSILLECTOMY      SD EDG US EXAM SURGICAL ALTER STOM DUODENUM/JEJUNUM  12/20/2016         SD ENDOSCOPIC ULTRASOUND EXAM  2/7/2018              Family History   Problem Relation Age of Onset    Cancer Mother         colon    Lung Disease Father         mesothelioma    Heart Disease Father     Diabetes Sister     Colon Cancer Maternal Uncle         Social History     Socioeconomic History    Marital status:      Spouse name: Not on file    Number of children: Not on file    Years of education: Not on file    Highest education level: Not on file   Social Needs    Financial resource strain: Not on file    Food insecurity - worry: Not on file    Food insecurity - inability: Not on file   [employer] needs - medical: Not on file   [employer] needs - non-medical: Not on file   Occupational History    Not on file   Tobacco Use    Smoking status: Never Smoker    Smokeless tobacco: Never Used   Substance and Sexual Activity    Alcohol use:  Yes     Alcohol/week: 0.6 - 1.2 oz     Types: 1 - 2 Glasses of wine per week    Drug use: No    Sexual activity: Not on file   Other Topics Concern    Not on file   Social History Narrative    Not on file                ALLERGIES: Tape [adhesive]    Review of Systems   Constitutional: Positive for fatigue. Negative for fever. HENT: Positive for congestion, rhinorrhea, sinus pressure, sinus pain and sore throat. Negative for dental problem and ear pain. Respiratory: Positive for cough. Vitals:    12/02/18 0857   BP: 143/83   Pulse: 81   Resp: 16   Temp: 97.6 °F (36.4 °C)   SpO2: 95%   Weight: 242 lb (109.8 kg)   Height: 6' 1\" (1.854 m)       Physical Exam   Constitutional: He is oriented to person, place, and time. He appears well-developed and well-nourished. HENT:   Mouth/Throat: Oropharynx is clear and moist. No oropharyngeal exudate. Maxillary and frontal sinus tenderness   Eyes: Conjunctivae are normal.   Neck: Normal range of motion. Neck supple. Cardiovascular: Normal rate. Pulmonary/Chest: Effort normal and breath sounds normal.   Lymphadenopathy:     He has no cervical adenopathy. Neurological: He is alert and oriented to person, place, and time. Skin: Skin is warm and dry. Psychiatric: He has a normal mood and affect. His behavior is normal.   Nursing note and vitals reviewed.       MDM     Differential Diagnosis; Clinical Impression; Plan:     sinusitis  Progress:   Patient progress:  Stable      Procedures

## 2018-12-02 NOTE — PATIENT INSTRUCTIONS
Sinusitis: Care Instructions  Your Care Instructions    Sinusitis is an infection of the lining of the sinus cavities in your head. Sinusitis often follows a cold. It causes pain and pressure in your head and face. In most cases, sinusitis gets better on its own in 1 to 2 weeks. But some mild symptoms may last for several weeks. Sometimes antibiotics are needed. Follow-up care is a key part of your treatment and safety. Be sure to make and go to all appointments, and call your doctor if you are having problems. It's also a good idea to know your test results and keep a list of the medicines you take. How can you care for yourself at home? · Take an over-the-counter pain medicine, such as acetaminophen (Tylenol), ibuprofen (Advil, Motrin), or naproxen (Aleve). Read and follow all instructions on the label. · If the doctor prescribed antibiotics, take them as directed. Do not stop taking them just because you feel better. You need to take the full course of antibiotics. · Be careful when taking over-the-counter cold or flu medicines and Tylenol at the same time. Many of these medicines have acetaminophen, which is Tylenol. Read the labels to make sure that you are not taking more than the recommended dose. Too much acetaminophen (Tylenol) can be harmful. · Breathe warm, moist air from a steamy shower, a hot bath, or a sink filled with hot water. Avoid cold, dry air. Using a humidifier in your home may help. Follow the directions for cleaning the machine. · Use saline (saltwater) nasal washes to help keep your nasal passages open and wash out mucus and bacteria. You can buy saline nose drops at a grocery store or drugstore. Or you can make your own at home by adding 1 teaspoon of salt and 1 teaspoon of baking soda to 2 cups of distilled water. If you make your own, fill a bulb syringe with the solution, insert the tip into your nostril, and squeeze gently. Melyssa Mon your nose.   · Put a hot, wet towel or a warm gel pack on your face 3 or 4 times a day for 5 to 10 minutes each time. · Try a decongestant nasal spray like oxymetazoline (Afrin). Do not use it for more than 3 days in a row. Using it for more than 3 days can make your congestion worse. When should you call for help? Call your doctor now or seek immediate medical care if:    · You have new or worse swelling or redness in your face or around your eyes.     · You have a new or higher fever.    Watch closely for changes in your health, and be sure to contact your doctor if:    · You have new or worse facial pain.     · The mucus from your nose becomes thicker (like pus) or has new blood in it.     · You are not getting better as expected. Where can you learn more? Go to http://luis eduardo-idris.info/. Enter Z232 in the search box to learn more about \"Sinusitis: Care Instructions. \"  Current as of: March 28, 2018  Content Version: 11.8  © 8869-9833 [employer]. Care instructions adapted under license by [employer] (which disclaims liability or warranty for this information). If you have questions about a medical condition or this instruction, always ask your healthcare professional. [place] [address] any warranty or liability for your use of this information.

## 2019-01-14 ENCOUNTER — HOSPITAL ENCOUNTER (OUTPATIENT)
Dept: MRI IMAGING | Age: 69
Discharge: HOME | End: 2019-01-14
Attending: INTERNAL MEDICINE
Payer: MEDICARE

## 2019-01-14 DIAGNOSIS — K86.89 DILATED PANCREATIC DUCT: ICD-10-CM

## 2019-01-14 PROCEDURE — 74183 MRI ABD W/O CNTR FLWD CNTR: CPT

## 2019-01-14 PROCEDURE — 74011250636 HC RX REV CODE- 250/636: Performed by: INTERNAL MEDICINE

## 2019-01-14 PROCEDURE — A9585 GADOBUTROL INJECTION: HCPCS | Performed by: INTERNAL MEDICINE

## 2019-01-14 RX ADMIN — GADOBUTROL 10 ML: 604.72 INJECTION INTRAVENOUS at 10:59

## 2019-04-01 RX ORDER — MELOXICAM 7.5 MG/1
7.5 TABLET ORAL
COMMUNITY

## 2019-04-02 ENCOUNTER — ANESTHESIA EVENT (OUTPATIENT)
Dept: ENDOSCOPY | Age: 69
End: 2019-04-02
Payer: MEDICARE

## 2019-04-03 ENCOUNTER — HOSPITAL ENCOUNTER (OUTPATIENT)
Age: 69
Setting detail: OUTPATIENT SURGERY
Discharge: HOME | End: 2019-04-03
Attending: INTERNAL MEDICINE | Admitting: INTERNAL MEDICINE
Payer: MEDICARE

## 2019-04-03 ENCOUNTER — ANESTHESIA (OUTPATIENT)
Dept: ENDOSCOPY | Age: 69
End: 2019-04-03
Payer: MEDICARE

## 2019-04-03 VITALS
RESPIRATION RATE: 18 BRPM | WEIGHT: 233.06 LBS | SYSTOLIC BLOOD PRESSURE: 127 MMHG | DIASTOLIC BLOOD PRESSURE: 76 MMHG | TEMPERATURE: 97.8 F | OXYGEN SATURATION: 97 % | HEART RATE: 72 BPM | HEIGHT: 73 IN | BODY MASS INDEX: 30.89 KG/M2

## 2019-04-03 PROCEDURE — 74011250636 HC RX REV CODE- 250/636: Performed by: INTERNAL MEDICINE

## 2019-04-03 PROCEDURE — 76060000031 HC ANESTHESIA FIRST 0.5 HR: Performed by: INTERNAL MEDICINE

## 2019-04-03 PROCEDURE — 88305 TISSUE EXAM BY PATHOLOGIST: CPT

## 2019-04-03 PROCEDURE — 74011250636 HC RX REV CODE- 250/636

## 2019-04-03 PROCEDURE — 77030013992 HC SNR POLYP ENDOSC BSC -B: Performed by: INTERNAL MEDICINE

## 2019-04-03 PROCEDURE — 76040000019: Performed by: INTERNAL MEDICINE

## 2019-04-03 PROCEDURE — 77030009426 HC FCPS BIOP ENDOSC BSC -B: Performed by: INTERNAL MEDICINE

## 2019-04-03 RX ORDER — EPINEPHRINE 0.1 MG/ML
1 INJECTION INTRACARDIAC; INTRAVENOUS
Status: DISCONTINUED | OUTPATIENT
Start: 2019-04-03 | End: 2019-04-03 | Stop reason: HOSPADM

## 2019-04-03 RX ORDER — NALOXONE HYDROCHLORIDE 0.4 MG/ML
0.4 INJECTION, SOLUTION INTRAMUSCULAR; INTRAVENOUS; SUBCUTANEOUS
Status: DISCONTINUED | OUTPATIENT
Start: 2019-04-03 | End: 2019-04-03 | Stop reason: HOSPADM

## 2019-04-03 RX ORDER — SODIUM CHLORIDE 9 MG/ML
100 INJECTION, SOLUTION INTRAVENOUS CONTINUOUS
Status: DISCONTINUED | OUTPATIENT
Start: 2019-04-03 | End: 2019-04-03 | Stop reason: HOSPADM

## 2019-04-03 RX ORDER — SODIUM CHLORIDE 0.9 % (FLUSH) 0.9 %
5-40 SYRINGE (ML) INJECTION AS NEEDED
Status: DISCONTINUED | OUTPATIENT
Start: 2019-04-03 | End: 2019-04-03 | Stop reason: HOSPADM

## 2019-04-03 RX ORDER — SODIUM CHLORIDE 0.9 % (FLUSH) 0.9 %
5-40 SYRINGE (ML) INJECTION AS NEEDED
Status: DISCONTINUED | OUTPATIENT
Start: 2019-04-03 | End: 2019-04-03 | Stop reason: SDUPTHER

## 2019-04-03 RX ORDER — MIDAZOLAM HYDROCHLORIDE 1 MG/ML
1-3 INJECTION, SOLUTION INTRAMUSCULAR; INTRAVENOUS
Status: DISCONTINUED | OUTPATIENT
Start: 2019-04-03 | End: 2019-04-03 | Stop reason: HOSPADM

## 2019-04-03 RX ORDER — DEXTROMETHORPHAN/PSEUDOEPHED 2.5-7.5/.8
1.2 DROPS ORAL
Status: DISCONTINUED | OUTPATIENT
Start: 2019-04-03 | End: 2019-04-03 | Stop reason: SDUPTHER

## 2019-04-03 RX ORDER — LIDOCAINE HYDROCHLORIDE 20 MG/ML
INJECTION, SOLUTION EPIDURAL; INFILTRATION; INTRACAUDAL; PERINEURAL AS NEEDED
Status: DISCONTINUED | OUTPATIENT
Start: 2019-04-03 | End: 2019-04-03 | Stop reason: HOSPADM

## 2019-04-03 RX ORDER — PROPOFOL 10 MG/ML
INJECTION, EMULSION INTRAVENOUS AS NEEDED
Status: DISCONTINUED | OUTPATIENT
Start: 2019-04-03 | End: 2019-04-03 | Stop reason: HOSPADM

## 2019-04-03 RX ORDER — ATROPINE SULFATE 0.1 MG/ML
0.5 INJECTION INTRAVENOUS
Status: DISCONTINUED | OUTPATIENT
Start: 2019-04-03 | End: 2019-04-03 | Stop reason: HOSPADM

## 2019-04-03 RX ORDER — EPINEPHRINE 0.1 MG/ML
1 INJECTION INTRACARDIAC; INTRAVENOUS
Status: DISCONTINUED | OUTPATIENT
Start: 2019-04-03 | End: 2019-04-03 | Stop reason: SDUPTHER

## 2019-04-03 RX ORDER — FLUMAZENIL 0.1 MG/ML
0.2 INJECTION INTRAVENOUS
Status: DISCONTINUED | OUTPATIENT
Start: 2019-04-03 | End: 2019-04-03 | Stop reason: SDUPTHER

## 2019-04-03 RX ORDER — ATROPINE SULFATE 0.1 MG/ML
0.5 INJECTION INTRAVENOUS
Status: DISCONTINUED | OUTPATIENT
Start: 2019-04-03 | End: 2019-04-03 | Stop reason: SDUPTHER

## 2019-04-03 RX ORDER — FLUMAZENIL 0.1 MG/ML
0.2 INJECTION INTRAVENOUS
Status: DISCONTINUED | OUTPATIENT
Start: 2019-04-03 | End: 2019-04-03 | Stop reason: HOSPADM

## 2019-04-03 RX ORDER — SODIUM CHLORIDE 0.9 % (FLUSH) 0.9 %
5-40 SYRINGE (ML) INJECTION EVERY 8 HOURS
Status: DISCONTINUED | OUTPATIENT
Start: 2019-04-03 | End: 2019-04-03 | Stop reason: SDUPTHER

## 2019-04-03 RX ORDER — SODIUM CHLORIDE 0.9 % (FLUSH) 0.9 %
5-40 SYRINGE (ML) INJECTION EVERY 8 HOURS
Status: DISCONTINUED | OUTPATIENT
Start: 2019-04-03 | End: 2019-04-03 | Stop reason: HOSPADM

## 2019-04-03 RX ORDER — DEXTROMETHORPHAN/PSEUDOEPHED 2.5-7.5/.8
1.2 DROPS ORAL
Status: DISCONTINUED | OUTPATIENT
Start: 2019-04-03 | End: 2019-04-03 | Stop reason: HOSPADM

## 2019-04-03 RX ADMIN — LIDOCAINE HYDROCHLORIDE 40 MG: 20 INJECTION, SOLUTION EPIDURAL; INFILTRATION; INTRACAUDAL; PERINEURAL at 11:21

## 2019-04-03 RX ADMIN — PROPOFOL 180 MG: 10 INJECTION, EMULSION INTRAVENOUS at 11:37

## 2019-04-03 RX ADMIN — SODIUM CHLORIDE 100 ML/HR: 900 INJECTION, SOLUTION INTRAVENOUS at 11:15

## 2019-04-03 NOTE — PERIOP NOTES
Anesthesia reports 180mg Propofol, 40mg Lidocaine and 400mL NS given during procedure. Received report from anesthesia staff on vital signs and status of patient.

## 2019-04-03 NOTE — DISCHARGE INSTRUCTIONS
Jeferson Palacios MD  [facility], [address]  [place], [address] [facility]  [phone]  [email or web address]    Ellen Sauceda  [id number]  1950    COLON DISCHARGE INSTRUCTIONS  Discomfort:  Redness at IV site- apply warm compress to area; if redness or soreness persist- contact your physician  There may be a slight amount of blood passed from the rectum  Gaseous discomfort- walking, belching will help relieve any discomfort  You may not operate a vehicle for 12 hours  You may not engage in an occupation involving machinery or appliances for rest of today  You may not drink alcoholic beverages for at least 12 hours  Avoid making any critical decisions for at least 24 hour  DIET:   Regular diet. - however -  remember your colon is empty and a heavy meal will produce gas. Avoid these foods:  vegetables, fried / greasy foods, carbonated drinks for today      ACTIVITY:  You may resume your normal daily activities it is recommended that you spend the remainder of the day resting -  avoid any strenuous activity. CALL M.D. ANY SIGN OF:   Increasing pain, nausea, vomiting  Abdominal distension (swelling)  New increased bleeding (oral or rectal)  Fever (chills)     COLONOSCOPY FINDINGS:  Your colonoscopy showed: one small polyp which we removed; diverticulosis; and medium sized internal hemorrhoids. Also took some biopsies to check for microscopic colitis. Follow-up Instructions:   Call Dr. Jeferson Palaciso if any questions or problems. Telephone # [phone]  Dr. Rebekah Bansal office will notify you of the biopsy results within 7 to 10 days and call you to schedule rubber band ligation of the hemorrhoids. Should have a repeat colonoscopy in 3 years.

## 2019-04-03 NOTE — H&P
Jeferson Palacios MD 
[facility], [address] [facility], [address] 
[phone] 
[email or web address] See office H and P. No interval change. Date of Surgery Update: 
Ellen Sauceda was seen and examined. History and physical has been reviewed. The patient has been examined.  There have been no significant clinical changes since the completion of the originally dated History and Physical. 
 
Signed By: Duane Knights., MD   
 April 3, 2019 11:22 AM

## 2019-04-03 NOTE — ANESTHESIA POSTPROCEDURE EVALUATION
Procedure(s): 
COLONOSCOPY 
COLON BIOPSY ENDOSCOPIC POLYPECTOMY. total IV anesthesia Anesthesia Post Evaluation Patient location during evaluation: PACU Note status: Adequate. Level of consciousness: responsive to verbal stimuli and sleepy but conscious Pain management: satisfactory to patient Airway patency: patent Anesthetic complications: no 
Cardiovascular status: acceptable Respiratory status: acceptable Hydration status: acceptable Comments: +Post-Anesthesia Evaluation and Assessment Patient: Chitra Sam MRN: [id number]  SSN: [id number] YOB: 1950  Age: 80 y.o. Sex: male Cardiovascular Function/Vital Signs /67   Pulse 78   Temp 36.6 °C (97.8 °F)   Resp 17   Ht 6' 1\" (1.854 m)   Wt 105.7 kg (233 lb 1 oz)   SpO2 99%   BMI 30.75 kg/m² Patient is status post Procedure(s): 
COLONOSCOPY 
COLON BIOPSY ENDOSCOPIC POLYPECTOMY. Nausea/Vomiting: Controlled. Postoperative hydration reviewed and adequate. Pain: 
Pain Scale 1: Numeric (0 - 10) (04/03/19 1152) Pain Intensity 1: 0 (04/03/19 1152) Managed. Neurological Status: At baseline. Mental Status and Level of Consciousness: Arousable. Pulmonary Status:  
O2 Device: Room air (04/03/19 1152) Adequate oxygenation and airway patent. Complications related to anesthesia: None Post-anesthesia assessment completed. No concerns. Signed By: Elizabeth Pereira MD  
 4/3/2019 Post anesthesia nausea and vomiting:  controlled Vitals Value Taken Time /70 4/3/2019 11:54 AM  
Temp 36.6 °C (97.8 °F) 4/3/2019 11:52 AM  
Pulse 69 4/3/2019 11:55 AM  
Resp 15 4/3/2019 11:55 AM  
SpO2 99 % 4/3/2019 11:55 AM  
Vitals shown include unvalidated device data.

## 2019-04-03 NOTE — ANESTHESIA PREPROCEDURE EVALUATION
Anesthetic History No history of anesthetic complications Review of Systems / Medical History Patient summary reviewed, nursing notes reviewed and pertinent labs reviewed Pulmonary Asthma : well controlled Neuro/Psych Within defined limits Cardiovascular Hypertension: well controlled Hyperlipidemia Exercise tolerance: >4 METS 
  
GI/Hepatic/Renal 
  
 
 
 
 
 
Comments: Change in bowel habits Chronic Pancreatitis Dilated Pancreatic Duct FH of GI cancer Endo/Other Obesity Other Findings Comments: Myasthenia Gravis (ocular per patient) Physical Exam 
 
Airway Mallampati: II 
TM Distance: > 6 cm Neck ROM: normal range of motion Mouth opening: Normal 
 
 Cardiovascular Regular rate and rhythm,  S1 and S2 normal,  no murmur, click, rub, or gallop Dental 
 
 
  
Pulmonary Breath sounds clear to auscultation Abdominal 
GI exam deferred Other Findings Anesthetic Plan ASA: 3 Anesthesia type: general and total IV anesthesia Induction: Intravenous Anesthetic plan and risks discussed with: Patient

## 2019-04-03 NOTE — ROUTINE PROCESS
Anabell Pearson 
[birth year] 
[id number] Situation: 
Verbal report received from: Doctor Rambo Tyrel Procedure: Procedure(s): 
COLONOSCOPY 
COLON BIOPSY ENDOSCOPIC POLYPECTOMY Background: 
 
Preoperative diagnosis: CHANGE IN BOWEL HABITS FAMILY HX OF GI TRACT CANCER 
DILATED PANCREATIC DUCT 
CHRONIC PANCREATITIS Postoperative diagnosis: diverticulosis, polyp, hemorrhoids :  Dr. Aysha Keen Assistant(s): Endoscopy Technician-1: Shaen Vallejo Endoscopy RN-1: Juanita Alvarez RN Specimens:  
ID Type Source Tests Collected by Time Destination 1 : ascending colon biopsy Preservative Colon, Ascending  Tyson Ley MD 4/3/2019 1130 Pathology 2 : descending colon biopsy Preservative Colon, Descending  Tyson Ley MD 4/3/2019 1132 Pathology 3 : sigmoid polyp Preservative Sigmoid  Tyson Ley MD 4/3/2019 1137 Pathology H. Pylori  no Assessment: 
Intra-procedure medications Anesthesia gave intra-procedure sedation and medications, see anesthesia flow sheet yes Intravenous fluids: NS@ Lambert Caper Vital signs stable Abdominal assessment: round and soft Recommendation: 
Discharge patient per MD order Britt Jaime Return to floor Family or Friend Wife Guerline Holden Permission to share finding with family or friend yes

## 2019-04-03 NOTE — PROCEDURES
Billy Bauman                  Colonoscopy Operative Report    4/3/2019      Enmanuel Joseph  [id number]  1950    Procedure Type:   Colonoscopy with biopsy, polypectomy (cold snare)     Indications:    Change in bowel habits, Family history of coloretal cancer (screening only), Family history of coloretal adenoma  (screening only)     Pre-operative Diagnosis: see indication above    Post-operative Diagnosis:  See findings below    :  Shahzad Lentz MD    Referring Provider: Kevon Cook MD      Sedation:  MAC anesthesia Propofol    Pre-Procedural Exam:      Airway: clear,  No airway problems anticipated  Heart: RRR, without gallops or rubs  Lungs: clear bilaterally without wheezes, crackles, or rhonchi  Abdomen: soft, nontender, nondistended, bowel sounds present  Mental Status: awake, alert and oriented to person, place and time     Procedure Details:  After informed consent was obtained with all risks and benefits of procedure explained and preoperative exam completed, the patient was taken to the endoscopy suite and placed in the left lateral decubitus position. Upon sequential sedation as per above, a digital rectal exam was performed . The Olympus videocolonoscope  was inserted in the rectum and carefully advanced to the cecum, which was identified by the ileocecal valve and appendiceal orifice. The cecum was identified by the ileocecal valve and appendiceal orifice. The quality of preparation was adequate. The colonoscope was slowly withdrawn with careful evaluation between folds. Retroflexion in the rectum was completed demonstrating internal hemorrhoids. Findings:   Rectum: Grade 1 internal hemorrhoid(s);   Sigmoid:     - Diverticulosis  7 mm polyp, cold snared  Descending Colon: normal, biopsied  Transverse Colon: normal  Ascending Colon: normal, biopsied  Cecum: normal  Terminal Ileum: not intubated      Specimen Removed:  1. ascending colon biopsies 2. descending colon biopsies  3. sigmoid polyp    Complications: None. EBL:  None. Impression:    1. sigmoid polyp 2. diverticulosis 3. internal hemorrhoids    Recommendations: --Await pathology. , -Repeat colonoscopy in 3 years. Regular diet. Resume normal medication(s). Discharge Disposition:  Home in the company of a  when able to ambulate. Rowan Cho MD    4/3/2019     RICHIE Gan MD  [facility], [address] [place] [address]  [phone]  [email or web address]

## 2019-04-15 ENCOUNTER — HOSPITAL ENCOUNTER (OUTPATIENT)
Dept: GENERAL RADIOLOGY | Age: 69
Discharge: HOME | End: 2019-04-15
Payer: MEDICARE

## 2019-04-15 DIAGNOSIS — Z77.090 PERSONAL HISTORY OF CONTACT WITH AND (SUSPECTED) EXPOSURE TO ASBESTOS: ICD-10-CM

## 2019-04-15 PROCEDURE — 71046 X-RAY EXAM CHEST 2 VIEWS: CPT

## 2019-08-01 ENCOUNTER — HOSPITAL ENCOUNTER (OUTPATIENT)
Dept: GENERAL RADIOLOGY | Age: 69
Discharge: HOME | End: 2019-08-01
Payer: MEDICARE

## 2019-08-01 DIAGNOSIS — Q79.9 BONE DEVELOPMENT ABNORMAL: ICD-10-CM

## 2019-08-01 PROCEDURE — 71120 X-RAY EXAM BREASTBONE 2/>VWS: CPT

## 2020-02-04 ENCOUNTER — HOSPITAL ENCOUNTER (OUTPATIENT)
Dept: MRI IMAGING | Age: 70
Discharge: HOME | End: 2020-02-04
Attending: INTERNAL MEDICINE
Payer: MEDICARE

## 2020-02-04 DIAGNOSIS — K86.1 CHRONIC PANCREATITIS (HCC): ICD-10-CM

## 2020-02-04 DIAGNOSIS — K86.89 DILATED PANCREATIC DUCT: ICD-10-CM

## 2020-02-04 LAB — CREAT BLD-MCNC: 0.7 MG/DL (ref 0.6–1.3)

## 2020-02-04 PROCEDURE — 74011250636 HC RX REV CODE- 250/636: Performed by: INTERNAL MEDICINE

## 2020-02-04 PROCEDURE — A9585 GADOBUTROL INJECTION: HCPCS | Performed by: INTERNAL MEDICINE

## 2020-02-04 PROCEDURE — 82565 ASSAY OF CREATININE: CPT

## 2020-02-04 PROCEDURE — 74183 MRI ABD W/O CNTR FLWD CNTR: CPT

## 2020-02-04 RX ADMIN — GADOBUTROL 10 ML: 604.72 INJECTION INTRAVENOUS at 10:13

## 2020-07-22 ENCOUNTER — HOSPITAL ENCOUNTER (OUTPATIENT)
Dept: GENERAL RADIOLOGY | Age: 70
Discharge: HOME | End: 2020-07-22
Payer: MEDICARE

## 2020-07-22 DIAGNOSIS — Z77.090 ASBESTOS EXPOSURE: ICD-10-CM

## 2020-07-22 PROCEDURE — 71046 X-RAY EXAM CHEST 2 VIEWS: CPT

## 2021-01-18 ENCOUNTER — TRANSCRIBE ORDER (OUTPATIENT)
Dept: SCHEDULING | Age: 71
End: 2021-01-18

## 2021-01-18 DIAGNOSIS — K86.1 CHRONIC PANCREATITIS (HCC): Primary | ICD-10-CM

## 2021-02-08 ENCOUNTER — HOSPITAL ENCOUNTER (OUTPATIENT)
Dept: MRI IMAGING | Age: 71
Discharge: HOME | End: 2021-02-08
Attending: INTERNAL MEDICINE
Payer: MEDICARE

## 2021-02-08 DIAGNOSIS — K86.1 CHRONIC PANCREATITIS (HCC): ICD-10-CM

## 2021-02-08 PROCEDURE — 74183 MRI ABD W/O CNTR FLWD CNTR: CPT

## 2021-02-08 PROCEDURE — 74011250636 HC RX REV CODE- 250/636: Performed by: INTERNAL MEDICINE

## 2021-02-08 PROCEDURE — A9585 GADOBUTROL INJECTION: HCPCS | Performed by: INTERNAL MEDICINE

## 2021-02-08 RX ADMIN — GADOBUTROL 10 ML: 604.72 INJECTION INTRAVENOUS at 11:00

## 2021-02-13 ENCOUNTER — HOSPITAL ENCOUNTER (OUTPATIENT)
Dept: PREADMISSION TESTING | Age: 71
Discharge: HOME | End: 2021-02-13
Payer: MEDICARE

## 2021-02-13 LAB — SARS-COV-2, COV2: NORMAL

## 2021-02-13 PROCEDURE — U0003 INFECTIOUS AGENT DETECTION BY NUCLEIC ACID (DNA OR RNA); SEVERE ACUTE RESPIRATORY SYNDROME CORONAVIRUS 2 (SARS-COV-2) (CORONAVIRUS DISEASE [COVID-19]), AMPLIFIED PROBE TECHNIQUE, MAKING USE OF HIGH THROUGHPUT TECHNOLOGIES AS DESCRIBED BY CMS-2020-01-R: HCPCS

## 2021-02-14 LAB — SARS-COV-2, COV2NT: NOT DETECTED

## 2021-02-15 ENCOUNTER — HOSPITAL ENCOUNTER (OUTPATIENT)
Dept: PREADMISSION TESTING | Age: 71
Discharge: HOME | End: 2021-02-15
Payer: MEDICARE

## 2021-02-15 LAB
ATRIAL RATE: 72 BPM
CALCULATED P AXIS, ECG09: 48 DEGREES
CALCULATED R AXIS, ECG10: 23 DEGREES
CALCULATED T AXIS, ECG11: 28 DEGREES
DIAGNOSIS, 93000: NORMAL
P-R INTERVAL, ECG05: 134 MS
Q-T INTERVAL, ECG07: 388 MS
QRS DURATION, ECG06: 86 MS
QTC CALCULATION (BEZET), ECG08: 424 MS
VENTRICULAR RATE, ECG03: 72 BPM

## 2021-02-15 PROCEDURE — 93005 ELECTROCARDIOGRAM TRACING: CPT

## 2021-02-17 ENCOUNTER — ANESTHESIA EVENT (OUTPATIENT)
Dept: ENDOSCOPY | Age: 71
End: 2021-02-17
Payer: MEDICARE

## 2021-02-17 ENCOUNTER — HOSPITAL ENCOUNTER (OUTPATIENT)
Age: 71
Setting detail: OUTPATIENT SURGERY
Discharge: HOME | End: 2021-02-17
Attending: INTERNAL MEDICINE | Admitting: INTERNAL MEDICINE
Payer: MEDICARE

## 2021-02-17 ENCOUNTER — ANESTHESIA (OUTPATIENT)
Dept: ENDOSCOPY | Age: 71
End: 2021-02-17
Payer: MEDICARE

## 2021-02-17 VITALS
HEART RATE: 89 BPM | RESPIRATION RATE: 18 BRPM | DIASTOLIC BLOOD PRESSURE: 67 MMHG | HEIGHT: 72 IN | SYSTOLIC BLOOD PRESSURE: 119 MMHG | TEMPERATURE: 97.4 F | OXYGEN SATURATION: 95 % | BODY MASS INDEX: 31.42 KG/M2 | WEIGHT: 232 LBS

## 2021-02-17 PROCEDURE — 88305 TISSUE EXAM BY PATHOLOGIST: CPT

## 2021-02-17 PROCEDURE — 77030019988 HC FCPS ENDOSC DISP BSC -B: Performed by: INTERNAL MEDICINE

## 2021-02-17 PROCEDURE — 77030003406 HC NDL ASPIR BIOP OCOA -C: Performed by: INTERNAL MEDICINE

## 2021-02-17 PROCEDURE — 74011250636 HC RX REV CODE- 250/636: Performed by: REGISTERED NURSE

## 2021-02-17 PROCEDURE — 77030019957 HC CUF BLN GASTSCP OCOA -B: Performed by: INTERNAL MEDICINE

## 2021-02-17 PROCEDURE — 2709999900 HC NON-CHARGEABLE SUPPLY: Performed by: INTERNAL MEDICINE

## 2021-02-17 PROCEDURE — 74011000250 HC RX REV CODE- 250: Performed by: REGISTERED NURSE

## 2021-02-17 PROCEDURE — 76040000019: Performed by: INTERNAL MEDICINE

## 2021-02-17 PROCEDURE — 74011250636 HC RX REV CODE- 250/636: Performed by: INTERNAL MEDICINE

## 2021-02-17 PROCEDURE — 76060000031 HC ANESTHESIA FIRST 0.5 HR: Performed by: INTERNAL MEDICINE

## 2021-02-17 RX ORDER — SODIUM CHLORIDE 9 MG/ML
75 INJECTION, SOLUTION INTRAVENOUS CONTINUOUS
Status: DISCONTINUED | OUTPATIENT
Start: 2021-02-17 | End: 2021-02-17 | Stop reason: HOSPADM

## 2021-02-17 RX ORDER — DEXTROMETHORPHAN/PSEUDOEPHED 2.5-7.5/.8
1.2 DROPS ORAL
Status: DISCONTINUED | OUTPATIENT
Start: 2021-02-17 | End: 2021-02-17 | Stop reason: HOSPADM

## 2021-02-17 RX ORDER — GLYCOPYRROLATE 0.2 MG/ML
INJECTION INTRAMUSCULAR; INTRAVENOUS AS NEEDED
Status: DISCONTINUED | OUTPATIENT
Start: 2021-02-17 | End: 2021-02-17 | Stop reason: HOSPADM

## 2021-02-17 RX ORDER — NALOXONE HYDROCHLORIDE 0.4 MG/ML
0.4 INJECTION, SOLUTION INTRAMUSCULAR; INTRAVENOUS; SUBCUTANEOUS
Status: DISCONTINUED | OUTPATIENT
Start: 2021-02-17 | End: 2021-02-17 | Stop reason: HOSPADM

## 2021-02-17 RX ORDER — EPINEPHRINE 0.1 MG/ML
1 INJECTION INTRACARDIAC; INTRAVENOUS
Status: DISCONTINUED | OUTPATIENT
Start: 2021-02-17 | End: 2021-02-17 | Stop reason: HOSPADM

## 2021-02-17 RX ORDER — PROPOFOL 10 MG/ML
INJECTION, EMULSION INTRAVENOUS AS NEEDED
Status: DISCONTINUED | OUTPATIENT
Start: 2021-02-17 | End: 2021-02-17 | Stop reason: HOSPADM

## 2021-02-17 RX ORDER — SODIUM CHLORIDE 0.9 % (FLUSH) 0.9 %
5-40 SYRINGE (ML) INJECTION AS NEEDED
Status: DISCONTINUED | OUTPATIENT
Start: 2021-02-17 | End: 2021-02-17 | Stop reason: HOSPADM

## 2021-02-17 RX ORDER — ATROPINE SULFATE 0.1 MG/ML
0.5 INJECTION INTRAVENOUS
Status: DISCONTINUED | OUTPATIENT
Start: 2021-02-17 | End: 2021-02-17 | Stop reason: HOSPADM

## 2021-02-17 RX ORDER — SODIUM CHLORIDE 0.9 % (FLUSH) 0.9 %
5-40 SYRINGE (ML) INJECTION EVERY 8 HOURS
Status: DISCONTINUED | OUTPATIENT
Start: 2021-02-17 | End: 2021-02-17 | Stop reason: HOSPADM

## 2021-02-17 RX ORDER — LEVOFLOXACIN 5 MG/ML
500 INJECTION, SOLUTION INTRAVENOUS ONCE
Status: COMPLETED | OUTPATIENT
Start: 2021-02-17 | End: 2021-02-17

## 2021-02-17 RX ORDER — PROPOFOL 10 MG/ML
INJECTION, EMULSION INTRAVENOUS
Status: DISCONTINUED | OUTPATIENT
Start: 2021-02-17 | End: 2021-02-17 | Stop reason: HOSPADM

## 2021-02-17 RX ORDER — LIDOCAINE HYDROCHLORIDE 20 MG/ML
INJECTION, SOLUTION EPIDURAL; INFILTRATION; INTRACAUDAL; PERINEURAL AS NEEDED
Status: DISCONTINUED | OUTPATIENT
Start: 2021-02-17 | End: 2021-02-17 | Stop reason: HOSPADM

## 2021-02-17 RX ORDER — FLUMAZENIL 0.1 MG/ML
0.2 INJECTION INTRAVENOUS
Status: DISCONTINUED | OUTPATIENT
Start: 2021-02-17 | End: 2021-02-17 | Stop reason: HOSPADM

## 2021-02-17 RX ADMIN — GLYCOPYRROLATE 0.2 MG: 0.2 INJECTION, SOLUTION INTRAMUSCULAR; INTRAVENOUS at 11:48

## 2021-02-17 RX ADMIN — PROPOFOL 80 MG: 10 INJECTION, EMULSION INTRAVENOUS at 11:48

## 2021-02-17 RX ADMIN — PROPOFOL 50 MG: 10 INJECTION, EMULSION INTRAVENOUS at 12:06

## 2021-02-17 RX ADMIN — LEVOFLOXACIN 500 MG: 5 INJECTION, SOLUTION INTRAVENOUS at 11:11

## 2021-02-17 RX ADMIN — PROPOFOL 40 MG: 10 INJECTION, EMULSION INTRAVENOUS at 11:51

## 2021-02-17 RX ADMIN — PROPOFOL 40 MG: 10 INJECTION, EMULSION INTRAVENOUS at 11:59

## 2021-02-17 RX ADMIN — LIDOCAINE HYDROCHLORIDE 100 MG: 20 INJECTION, SOLUTION EPIDURAL; INFILTRATION; INTRACAUDAL; PERINEURAL at 11:48

## 2021-02-17 RX ADMIN — PROPOFOL 40 MG: 10 INJECTION, EMULSION INTRAVENOUS at 11:58

## 2021-02-17 RX ADMIN — SODIUM CHLORIDE 75 ML/HR: 9 INJECTION, SOLUTION INTRAVENOUS at 11:11

## 2021-02-17 RX ADMIN — PROPOFOL 75 MCG/KG/MIN: 10 INJECTION, EMULSION INTRAVENOUS at 11:48

## 2021-02-17 NOTE — H&P
Gastroenterology Outpatient History and Physical    Patient: Maryuri Jaradtamir    Physician: Shelby Marks MD    Chief Complaint: Pancreatic cysts  History of Present Illness: Chronic pancreatitsi    History:  Past Medical History:   Diagnosis Date    Adverse effect of anesthesia     have to be particular with type of anesthesia d/t myasthenia gravis    Asthma     Elevated cholesterol     Hypertension     Myasthenia gravis ([facility] [address]     Seasonal allergic rhinitis     Sleep apnea     mild, no cpap      Past Surgical History:   Procedure Laterality Date    COLONOSCOPY N/A 3/7/2018    COL[facility]COPY    COLONOSCOPY N/A 4/3/2019    [facility] at [facility]  3/7/2018         COLONOSCOPY,DIAGNOSTIC  4/3/2019         Manolo Kim  4/3/2019         HX COLONOSCOPY      HX HEENT      nasal polyps    HX HERNIA REPAIR      HX ORTHOPAEDIC Right     rotator cuff repair    HX ORTHOPAEDIC Bilateral     tavo knee replacement    HX ORTHOPAEDIC Left     toe joint/synovial cyst removed    HX TONSILLECTOMY      [place] EDG US EXAM SURGICAL ALTER STOM DUODENUM/JEJUNUM  12/20/2016         [place] ENDOSCOPIC ULTRASOUND EXAM  2/7/2018           Social History     Socioeconomic History    Marital status:      Spouse name: Not on file    Number of children: Not on file    Years of education: Not on file    Highest education level: Not on file   Tobacco Use    Smoking status: Never Smoker    Smokeless tobacco: Never Used   Substance and Sexual Activity    Alcohol use:  Yes     Alcohol/week: 1.0 - 2.0 standard drinks     Types: 1 - 2 Glasses of wine per week     Comment: rare    Drug use: No      Family History   Problem Relation Age of Onset    Cancer Mother         colon    Lung Disease Father         mesothelioma    Heart Disease Father     Diabetes Sister    Yelena [facility] Colon Cancer Maternal Uncle     There is no problem list on file for this patient. Allergies: Allergies   Allergen Reactions    Tape [Adhesive] Rash     Medications:   Prior to Admission medications    Medication Sig Start Date End Date Taking? Authorizing Provider   multivitamin, tx-iron-ca-min (THERA-M W/ IRON) 9 mg iron-400 mcg tab tablet Take 1 Tab by mouth daily. Yes Provider, Historical   meloxicam (MOBIC) 7.5 mg tablet Take 7.5 mg by mouth daily. Yes Provider, Historical   budesonide-formoterol (SYMBICORT) 160-4.5 mcg/actuation HFAA Take 2 Puffs by inhalation two (2) times a day. Yes Provider, Historical   mycophenolate (CELLCEPT) 500 mg tablet Take 1,000 mg by mouth two (2) times a day. Yes Provider, Historical   lisinopril (PRINIVIL, ZESTRIL) 10 mg tablet Take 10 mg by mouth daily. Yes Provider, Historical   hydroCHLOROthiazide (HYDRODIURIL) 25 mg tablet Take 25 mg by mouth daily. Yes Provider, Historical   atorvastatin (LIPITOR) 10 mg tablet Take 10 mg by mouth daily. Yes Provider, Historical   Cetirizine (ZYRTEC) 10 mg cap Take  by mouth daily. Yes Provider, Historical   azelastine-fluticasone (DYMISTA) 137-50 mcg/spray spry by Nasal route as needed. Yes Provider, Historical   albuterol (VENTOLIN HFA) 90 mcg/actuation inhaler Take 2 Puffs by inhalation every four (4) hours as needed. Yes Provider, Historical     Physical Exam:   Vital Signs: Blood pressure 136/84, pulse 96, temperature 98 °F (36.7 °C), resp. rate 12, height 6' (1.829 m), weight 105.2 kg (232 lb), SpO2 99 %.   General: well developed, well nourished   HEENT: unremarkable   Heart: regular rhythm no mumur    Lungs: clear   Abdominal:  benign   Neurological: unremarkable   Extremities: no edema     Findings/Diagnosis: Pancreatic cysts/Chronic pancreatitis  Plan of Care/Planned Procedure: EUS with conscious/deep sedation    Signed:  Shelby Marks MD 2/17/2021

## 2021-02-17 NOTE — PROCEDURES
NAME:  Maryuri Muniz   :   1950   MRN:   [id number]     [facility]    Date/Time:  2021   Procedure Type: EGD/EUS  Indications: Pancreatic cyst    Pre-operative Diagnosis: see indication above    Post-operative Diagnosis:  See findings below    : Pj Vale MD    Referring Provider: Christopher Wooten MD--Silvina Shah MD    Procedure Details:    Exam:  Airway: clear, no airway problems anticipated  Heart: RRR, without gallops or rubs  Lungs: clear bilaterally without wheezes, crackles, or rhonchi  Abdomen: soft, nontender, nondistended, bowel sounds present  Mental Status: awake, alert and oriented to person, place and time     Anethesia/Sedation:  MAC anesthesia Propofol      Procedure Details   After infom consent was obtained for the procedure, with all risks and benefits of procedure explained the patient was taken to the endoscopy suite and placed in the left lateral decubitus position. Following sequential administration of sedation as per above, the linear echoendoscope was inserted into the mouth and advanced under direct vision to second portion of the duodenum. A careful inspection was made as the gastroscope was withdrawn, including a retroflexed view of the proximal stomach; findings and interventions are described below. Findings:     Endoscopic:  1. Normal proximal and mid esophagus  2. Small, sliding hiatal hernia  3. Mild, patchy, non-erosive antral gastropathy. Biopsied  4. Stomach otherwise normal, including retroflexion  5. Normal duodenal bulb and 2nd portion of the duodenum    Ultrasound:      Pancreas: Moderately severe chronic pancreatitis as evidenced by lobularity and dilated side branches without any masses. This changes were most significant in the pancreatic head and tail        Specimen Removed: Gastric    Complications: None. EBL:  None. Recommendations:   1. Follow up pathology  2.  Surveillance MRI/MRCP in 1 year    Magnus Pop Erma Lang MD

## 2021-02-17 NOTE — ANESTHESIA PREPROCEDURE EVALUATION
Anesthetic History   No history of anesthetic complications            Review of Systems / Medical History  Patient summary reviewed, nursing notes reviewed and pertinent labs reviewed    Pulmonary            Asthma        Neuro/Psych   Within defined limits           Cardiovascular    Hypertension          Hyperlipidemia    Exercise tolerance: >4 METS     GI/Hepatic/Renal               Comments: PANCREATIC CYST   Endo/Other        Obesity     Other Findings   Comments: Myasthenia gravis   Seasonal allergic rhinitis  DJD           Physical Exam    Airway  Mallampati: I    Neck ROM: normal range of motion   Mouth opening: Normal     Cardiovascular  Regular rate and rhythm,  S1 and S2 normal,  no murmur, click, rub, or gallop             Dental  No notable dental hx       Pulmonary  Breath sounds clear to auscultation               Abdominal  GI exam deferred       Other Findings            Anesthetic Plan    ASA: 2  Anesthesia type: total IV anesthesia          Induction: Intravenous  Anesthetic plan and risks discussed with: Patient

## 2021-02-17 NOTE — PROGRESS NOTES
Bree Gut  1950  [id number]    Situation:  Verbal report received from: Pancho June RN  Procedure: Procedure(s):  ENDOSCOPIC ULTRASOUND (EUS)  ESOPHAGOGASTRODUODENOSCOPY (EGD)  ESOPHAGOGASTRODUODENAL (EGD) BIOPSY    Background:    Preoperative diagnosis: PANCREATIC CYST  Postoperative diagnosis: egd-gastritis, hiatal hernia  EUS- chronic pancreatitis    :  Dr. Wellington John  Assistant(s): Endoscopy Technician-1: Usman Hodgson  Endoscopy RN-1: Francisco Solorio RN  Endoscopy RN-2: Sydni Jay    Specimens:   ID Type Source Tests Collected by Time Destination   1 : gastric bx Preservative Gastric  Stephaniecha Pimentel MD 2/17/2021 1137 Pathology     H. Pylori  no    Assessment:  Intra-procedure medications   Anesthesia gave intra-procedure sedation and medications, see anesthesia flow sheet yes    Intravenous fluids: NS@ KVO     Vital signs stable     Abdominal assessment: round and soft     Recommendation:  Discharge patient per MD order.   Family or Friend - aJne Salomon wife   Permission to share finding with family or friend

## 2021-02-17 NOTE — DISCHARGE INSTRUCTIONS
Armando Bynum  [id number]  1950    EUS DISCHARGE INSTRUCTIONS  Discomfort:  Sore throat- throat lozenges or warm salt water gargle  redness at IV site- apply warm compress to area; if redness or soreness persist- contact your physician  Gaseous discomfort- walking, belching will help relieve any discomfort  You may not operate a vehicle for 12 hours  You may not engage in an occupation involving machinery or appliances for rest of today  You may not drink alcoholic beverages for at least 12 hours  Avoid making any critical decisions for at least 24 hour  DIET  You may resume your regular diet - however -  remember your colon is empty and a heavy meal will produce gas. Avoid these foods:  vegetables, fried / greasy foods, carbonated drinks    MEDICATIONS:  Per Medication Reconciliation      ACTIVITY  You may resume your normal daily activities until tomorrow AM;  Spend the remainder of the day resting -  avoid any strenuous activity. CALL M.D. ANY SIGN OF   Increasing pain, nausea, vomiting  Abdominal distension (swelling)  New increased bleeding (oral or rectal)  Fever (chills)  Pain in chest area  Bloody discharge from nose or mouth  Shortness of breath    You may not take any Advil, Aspirin, Ibuprofen, Motrin, Aleve, or Goodys for 10 days, ONLY  Tylenol as needed for pain. IMPRESSION:  Endoscopic:  1. Normal proximal and mid esophagus  2. Small, sliding hiatal hernia  3. Mild, patchy, non-erosive antral gastropathy. Biopsied  4. Stomach otherwise normal, including retroflexion  5. Normal duodenal bulb and 2nd portion of the duodenum    Ultrasound:      Pancreas: Moderately severe chronic pancreatitis as evidenced by lobularity and dilated side branches without any masses. This changes were most significant in the pancreatic head and tail        Specimen Removed: Gastric    Complications: None. EBL:  None. Recommendations:   1. Follow up pathology  2.  Surveillance MRI/MRCP in 1 year    Follow-up Instructions:   Call Dr. Vincent Diaz for the results of procedure / biopsy in 7-10 days   Telephone # [phone]    Brando Mi MD

## 2021-10-12 ENCOUNTER — HOSPITAL ENCOUNTER (OUTPATIENT)
Dept: GENERAL RADIOLOGY | Age: 71
Discharge: HOME | End: 2021-10-12
Payer: MEDICARE

## 2021-10-12 ENCOUNTER — TRANSCRIBE ORDER (OUTPATIENT)
Dept: REGISTRATION | Age: 71
End: 2021-10-12

## 2021-10-12 DIAGNOSIS — Z77.090 PERSONAL HISTORY OF CONTACT WITH AND (SUSPECTED) EXPOSURE TO ASBESTOS: Primary | ICD-10-CM

## 2021-10-12 DIAGNOSIS — Z77.090 PERSONAL HISTORY OF CONTACT WITH AND (SUSPECTED) EXPOSURE TO ASBESTOS: ICD-10-CM

## 2021-10-12 PROCEDURE — 71046 X-RAY EXAM CHEST 2 VIEWS: CPT

## 2022-01-09 ENCOUNTER — TRANSCRIBE ORDER (OUTPATIENT)
Dept: SCHEDULING | Age: 72
End: 2022-01-09

## 2022-01-09 DIAGNOSIS — K86.1 CHRONIC PANCREATITIS (HCC): Primary | ICD-10-CM

## 2022-02-11 ENCOUNTER — HOSPITAL ENCOUNTER (OUTPATIENT)
Dept: MRI IMAGING | Age: 72
Discharge: HOME | End: 2022-02-11
Attending: INTERNAL MEDICINE
Payer: MEDICARE

## 2022-02-11 DIAGNOSIS — K86.1 CHRONIC PANCREATITIS (HCC): ICD-10-CM

## 2022-02-11 PROCEDURE — 74011250636 HC RX REV CODE- 250/636: Performed by: INTERNAL MEDICINE

## 2022-02-11 PROCEDURE — 74183 MRI ABD W/O CNTR FLWD CNTR: CPT

## 2022-02-11 PROCEDURE — A9577 INJ MULTIHANCE: HCPCS | Performed by: INTERNAL MEDICINE

## 2022-02-11 RX ADMIN — GADOBENATE DIMEGLUMINE 15 ML: 529 INJECTION, SOLUTION INTRAVENOUS at 11:00

## 2022-08-18 RX ORDER — CHOLECALCIFEROL (VITAMIN D3) 125 MCG
10 CAPSULE ORAL
COMMUNITY

## 2022-08-18 RX ORDER — SIMETHICONE 80 MG
80 TABLET,CHEWABLE ORAL
COMMUNITY

## 2022-08-19 ENCOUNTER — ANESTHESIA EVENT (OUTPATIENT)
Dept: ENDOSCOPY | Age: 72
End: 2022-08-19
Payer: MEDICARE

## 2022-08-19 ENCOUNTER — HOSPITAL ENCOUNTER (OUTPATIENT)
Age: 72
Setting detail: OUTPATIENT SURGERY
Discharge: HOME | End: 2022-08-19
Attending: INTERNAL MEDICINE | Admitting: INTERNAL MEDICINE
Payer: MEDICARE

## 2022-08-19 ENCOUNTER — ANESTHESIA (OUTPATIENT)
Dept: ENDOSCOPY | Age: 72
End: 2022-08-19
Payer: MEDICARE

## 2022-08-19 VITALS
HEIGHT: 72 IN | BODY MASS INDEX: 30.07 KG/M2 | SYSTOLIC BLOOD PRESSURE: 138 MMHG | TEMPERATURE: 98 F | WEIGHT: 222 LBS | RESPIRATION RATE: 19 BRPM | HEART RATE: 68 BPM | DIASTOLIC BLOOD PRESSURE: 75 MMHG | OXYGEN SATURATION: 97 %

## 2022-08-19 PROCEDURE — 2709999900 HC NON-CHARGEABLE SUPPLY: Performed by: INTERNAL MEDICINE

## 2022-08-19 PROCEDURE — 76040000019: Performed by: INTERNAL MEDICINE

## 2022-08-19 PROCEDURE — 77030021593 HC FCPS BIOP ENDOSC BSC -A: Performed by: INTERNAL MEDICINE

## 2022-08-19 PROCEDURE — 88305 TISSUE EXAM BY PATHOLOGIST: CPT

## 2022-08-19 PROCEDURE — 74011250636 HC RX REV CODE- 250/636: Performed by: NURSE ANESTHETIST, CERTIFIED REGISTERED

## 2022-08-19 PROCEDURE — 76060000031 HC ANESTHESIA FIRST 0.5 HR: Performed by: INTERNAL MEDICINE

## 2022-08-19 RX ORDER — DEXTROMETHORPHAN/PSEUDOEPHED 2.5-7.5/.8
1.2 DROPS ORAL
Status: DISCONTINUED | OUTPATIENT
Start: 2022-08-19 | End: 2022-08-19 | Stop reason: HOSPADM

## 2022-08-19 RX ORDER — FLUMAZENIL 0.1 MG/ML
0.2 INJECTION INTRAVENOUS
Status: DISCONTINUED | OUTPATIENT
Start: 2022-08-19 | End: 2022-08-19 | Stop reason: HOSPADM

## 2022-08-19 RX ORDER — MIDAZOLAM HYDROCHLORIDE 1 MG/ML
.25-5 INJECTION, SOLUTION INTRAMUSCULAR; INTRAVENOUS
Status: DISCONTINUED | OUTPATIENT
Start: 2022-08-19 | End: 2022-08-19 | Stop reason: HOSPADM

## 2022-08-19 RX ORDER — PROPOFOL 10 MG/ML
INJECTION, EMULSION INTRAVENOUS AS NEEDED
Status: DISCONTINUED | OUTPATIENT
Start: 2022-08-19 | End: 2022-08-19 | Stop reason: HOSPADM

## 2022-08-19 RX ORDER — NALOXONE HYDROCHLORIDE 0.4 MG/ML
0.4 INJECTION, SOLUTION INTRAMUSCULAR; INTRAVENOUS; SUBCUTANEOUS
Status: DISCONTINUED | OUTPATIENT
Start: 2022-08-19 | End: 2022-08-19 | Stop reason: HOSPADM

## 2022-08-19 RX ORDER — EPINEPHRINE 0.1 MG/ML
1 INJECTION INTRACARDIAC; INTRAVENOUS
Status: DISCONTINUED | OUTPATIENT
Start: 2022-08-19 | End: 2022-08-19 | Stop reason: HOSPADM

## 2022-08-19 RX ORDER — SODIUM CHLORIDE 0.9 % (FLUSH) 0.9 %
5-40 SYRINGE (ML) INJECTION EVERY 8 HOURS
Status: DISCONTINUED | OUTPATIENT
Start: 2022-08-19 | End: 2022-08-19 | Stop reason: HOSPADM

## 2022-08-19 RX ORDER — SODIUM CHLORIDE 9 MG/ML
INJECTION, SOLUTION INTRAVENOUS
Status: DISCONTINUED | OUTPATIENT
Start: 2022-08-19 | End: 2022-08-19 | Stop reason: HOSPADM

## 2022-08-19 RX ORDER — SODIUM CHLORIDE 9 MG/ML
100 INJECTION, SOLUTION INTRAVENOUS CONTINUOUS
Status: DISCONTINUED | OUTPATIENT
Start: 2022-08-19 | End: 2022-08-19 | Stop reason: HOSPADM

## 2022-08-19 RX ORDER — SODIUM CHLORIDE 0.9 % (FLUSH) 0.9 %
5-40 SYRINGE (ML) INJECTION AS NEEDED
Status: DISCONTINUED | OUTPATIENT
Start: 2022-08-19 | End: 2022-08-19 | Stop reason: HOSPADM

## 2022-08-19 RX ORDER — ATROPINE SULFATE 0.1 MG/ML
0.5 INJECTION INTRAVENOUS
Status: DISCONTINUED | OUTPATIENT
Start: 2022-08-19 | End: 2022-08-19 | Stop reason: HOSPADM

## 2022-08-19 RX ADMIN — PROPOFOL 250 MG: 10 INJECTION, EMULSION INTRAVENOUS at 08:03

## 2022-08-19 RX ADMIN — SODIUM CHLORIDE: 0.9 INJECTION, SOLUTION INTRAVENOUS at 07:56

## 2022-08-19 NOTE — ANESTHESIA PREPROCEDURE EVALUATION
Anesthetic History   No history of anesthetic complications            Review of Systems / Medical History  Patient summary reviewed, nursing notes reviewed and pertinent labs reviewed    Pulmonary        Sleep apnea: No treatment    Asthma        Neuro/Psych   Within defined limits           Cardiovascular    Hypertension          Hyperlipidemia    Exercise tolerance: >4 METS     GI/Hepatic/Renal               Comments: PANCREATIC CYST   Endo/Other        Obesity     Other Findings   Comments: Myasthenia gravis   Seasonal allergic rhinitis  DJD           Physical Exam    Airway  Mallampati: I    Neck ROM: normal range of motion   Mouth opening: Normal     Cardiovascular  Regular rate and rhythm,  S1 and S2 normal,  no murmur, click, rub, or gallop             Dental  No notable dental hx       Pulmonary  Breath sounds clear to auscultation               Abdominal  GI exam deferred       Other Findings            Anesthetic Plan    ASA: 2  Anesthesia type: total IV anesthesia and general          Induction: Intravenous  Anesthetic plan and risks discussed with: Patient      Propofol MAC

## 2022-08-19 NOTE — H&P
Joelle Alvarado MD  [facility], [address] [place] [address]  [phone]  [email or web address]      Gastroenterology Outpatient History and Physical    Patient: Christofer Jama    Physician: Ayde Velázquez MD    Vital Signs: Blood pressure 138/84, pulse 88, temperature 98.3 °F (36.8 °C), resp. rate 11, height 6' (1.829 m), weight 100.7 kg (222 lb), SpO2 98 %. Allergies: Allergies   Allergen Reactions    Tape [Adhesive] Rash     Paper tape fine       Chief Complaint: change in bowels    History of Present Illness: Here for colonoscopy to evaluate intermittent change in bowel habits with loose stools.  Hx of colonic polyps too    History:  Past Medical History:   Diagnosis Date    Adverse effect of anesthesia     have to be particular with type of anesthesia d/t myasthenia gravis    Asthma     Elevated cholesterol     Hypertension     Ill-defined condition     elevated cholesterol    Myasthenia gravis (HCC)     Seasonal allergic rhinitis     Sleep apnea     mild, no cpap      Past Surgical History:   Procedure Laterality Date    COLONOSCOPY N/A 03/07/2018    COLONOSCOPY performed by Ayde Velázquez MD at [facility] ENDOSCOPY    COLONOSCOPY N/A 04/03/2019    COLONOSCOPY performed by April Costello MD at [address]  03/07/2018         COLONOSCOPY,DIAGNOSTIC  04/03/2019         COLONOSCOPY,BLANCHE LAINEZ,SNARE  04/03/2019         HX COLONOSCOPY      HX HEENT      nasal polyps    HX HERNIA REPAIR      inguinal ? cant remember side    HX ORTHOPAEDIC Right     rotator cuff repair    HX ORTHOPAEDIC Bilateral     tavo knee replacement    HX ORTHOPAEDIC Left     toe joint/synovial cyst removed    HX ORTHOPAEDIC Right     CTR    HX TONSILLECTOMY      [place] EDG US EXAM SURGICAL ALTER STOM DUODENUM/JEJUNUM  12/20/2016         [place] ENDOSCOPIC ULTRASOUND EXAM  02/07/2018           Social History     Socioeconomic History    Marital status:    Tobacco Use    Smoking status: Never    Smokeless tobacco: Never   Vaping Use    Vaping Use: Never used   Substance and Sexual Activity    Alcohol use: Yes     Alcohol/week: 1.0 - 2.0 standard drink     Types: 1 - 2 Glasses of wine per week     Comment: rare    Drug use: No    Sexual activity: Yes      Family History   Problem Relation Age of Onset    Cancer Mother         colon    Lung Disease Father         mesothelioma    Heart Disease Father     Diabetes Sister     Colon Cancer Maternal Uncle     There is no problem list on file for this patient. Medications:   Prior to Admission medications    Medication Sig Start Date End Date Taking? Authorizing Provider   melatonin 5 mg tablet Take 10 mg by mouth nightly as needed. Yes Provider, Historical   simethicone (MYLICON) 80 mg chewable tablet Take 80 mg by mouth every six (6) hours as needed for Flatulence. Yes Provider, Historical   multivitamin, tx-iron-ca-min (THERA-M W/ IRON) 9 mg iron-400 mcg tab tablet Take 1 Tab by mouth daily. Yes Provider, Historical   meloxicam (MOBIC) 7.5 mg tablet Take 7.5 mg by mouth daily as needed. Yes Provider, Historical   budesonide-formoteroL (SYMBICORT) 160-4.5 mcg/actuation HFAA Take 2 Puffs by inhalation two (2) times daily as needed. Yes Provider, Historical   mycophenolate (CELLCEPT) 500 mg tablet Take 1,000 mg by mouth two (2) times a day. Yes Provider, Historical   lisinopril (PRINIVIL, ZESTRIL) 10 mg tablet Take 10 mg by mouth daily. Yes Provider, Historical   hydroCHLOROthiazide (HYDRODIURIL) 25 mg tablet Take 25 mg by mouth daily. Yes Provider, Historical   atorvastatin (LIPITOR) 10 mg tablet Take 10 mg by mouth daily. Yes Provider, Historical   Cetirizine 10 mg cap Take  by mouth daily. Yes Provider, Historical   azelastine-fluticasone (DYMISTA) 137-50 mcg/spray by Nasal route as needed.    Yes Provider, Historical   albuterol (PROVENTIL HFA, VENTOLIN HFA, PROAIR HFA) 90 mcg/actuation inhaler Take 2 Puffs by inhalation every four (4) hours as needed.    Yes Provider, Historical       Physical Exam:     General: well developed, well nourished   HEENT: unremarkable   Heart: regular rhythm no mumur    Lungs: clear   Abdominal:  benign   Neurological: unremarkable   Extremities: no edema     Findings/Diagnosis: Change in bowel habits    Plan of Care/Planned Procedure: Colonoscopy with  monitored anesthesia care sedation       Signed:  Chaka Borrego MD 8/19/2022

## 2022-08-19 NOTE — ROUTINE PROCESS
Eugene Joshi  1950  [id number]    Situation:  Verbal report received from: Una Cohen  Procedure: Procedure(s):  COLONOSCOPY  COLON BIOPSY    Background:    Preoperative diagnosis: HISTORY POLYPS  Postoperative diagnosis: Diverticulosis, hemorrhoids    :  Dr. Amadeo Conley  Assistant(s): Endoscopy Technician-1: Shira Amin  Endoscopy RN-1: Nahomy Cohen RN    Specimens:   ID Type Source Tests Collected by Time Destination   1 : Bx Preservative Colon, Ascending  Dara Corina., MD 8/19/2022 7878 Pathology   2 : Bx Preservative Colon, Descending  Dara Corina., MD 8/19/2022 5355 Pathology     H. Pylori  no    Assessment:  Intra-procedure medications     Anesthesia gave intra-procedure sedation and medications, see anesthesia flow sheet yes    Intravenous fluids: NS@ KVO     Vital signs stable     Abdominal assessment: round and soft     Recommendation:  Discharge patient per MD order  Family   Permission to share finding with family or friend yes

## 2022-08-19 NOTE — ANESTHESIA POSTPROCEDURE EVALUATION
Procedure(s):  COLONOSCOPY  COLON BIOPSY. total IV anesthesia, general    Anesthesia Post Evaluation        Patient location during evaluation: PACU  Note status: Adequate. Level of consciousness: responsive to verbal stimuli and sleepy but conscious  Pain management: satisfactory to patient  Airway patency: patent  Anesthetic complications: no  Cardiovascular status: acceptable  Respiratory status: acceptable  Hydration status: acceptable  Comments: +Post-Anesthesia Evaluation and Assessment    Patient: Rae Devi MRN: [id number]  SSN: [id number]   YOB: 1950  Age: 80 y.o. Sex: male      Cardiovascular Function/Vital Signs    /75   Pulse 68   Temp 36.7 °C (98 °F)   Resp 19   Ht 6' (1.829 m)   Wt 100.7 kg (222 lb)   SpO2 97%   BMI 30.11 kg/m²     Patient is status post Procedure(s):  COLONOSCOPY  COLON BIOPSY. Nausea/Vomiting: Controlled. Postoperative hydration reviewed and adequate. Pain:  Pain Scale 1: Numeric (0 - 10) (08/19/22 0846)  Pain Intensity 1: 0 (08/19/22 0846)   Managed. Neurological Status: At baseline. Mental Status and Level of Consciousness: Arousable. Pulmonary Status:   O2 Device: None (Room air) (08/19/22 0846)   Adequate oxygenation and airway patent. Complications related to anesthesia: None    Post-anesthesia assessment completed. No concerns. Signed By: Richard Dickson MD    8/19/2022  Post anesthesia nausea and vomiting:  controlled      INITIAL Post-op Vital signs:   Vitals Value Taken Time   /75 08/19/22 0846   Temp 36.7 °C (98 °F) 08/19/22 0830   Pulse 70 08/19/22 0847   Resp 14 08/19/22 0847   SpO2 97 % 08/19/22 0847   Vitals shown include unvalidated device data.

## 2022-08-19 NOTE — DISCHARGE INSTRUCTIONS
Teresa Milian MD  [facility], [address]  [place], [address] [facility]  [phone]  [email or web address]    Delfina Munson  [id number]  1950    COLON DISCHARGE INSTRUCTIONS  Discomfort:  Redness at IV site- apply warm compress to area; if redness or soreness persist- contact your physician  There may be a slight amount of blood passed from the rectum  Gaseous discomfort- walking, belching will help relieve any discomfort  You may not operate a vehicle for 12 hours  You may not engage in an occupation involving machinery or appliances for rest of today  You may not drink alcoholic beverages for at least 12 hours  Avoid making any critical decisions for at least 24 hour  DIET:   High fiber diet. - however -  remember your colon is empty and a heavy meal will produce gas. Avoid these foods:  vegetables, fried / greasy foods, carbonated drinks for today      ACTIVITY:  You may resume your normal daily activities it is recommended that you spend the remainder of the day resting -  avoid any strenuous activity. CALL M.D. ANY SIGN OF:   Increasing pain, nausea, vomiting  Abdominal distension (swelling)  New increased bleeding (oral or rectal)  Fever (chills)  Pain in chest area  Bloody discharge from nose or mouth  Shortness of breath     COLONOSCOPY FINDINGS:  Your colonoscopy showed: no polyps seen. Took some biopsies to check for microscopic colitis. .    Follow-up Instructions:   Call Dr. Teresa Milian if any questions or problems. Telephone # [phone]  Dr. Cast Liner office will notify you of the biopsy results within 7 to 10 days. Should have a repeat colonoscopy in 5 years.      Patient Education on Sedation / Analgesia Administered for Procedure      For 24 hours after general anesthesia or intravenous analgesia / sedation:  Have someone responsible help you with your care  Limit your activities  Do not drive and operate hazardous machinery  Do not make important personal, legal or business decisions  Do not drink alcoholic beverages  If you have not urinated within 8 hours after discharge, please contact your physician  Resume your medications unless otherwise instructed    For 24 hours after general anesthesia or intravenous analgesia / sedation  you may experience:  Drowsiness, dizziness, sleepiness, or confusion  Difficulty remembering or delayed reaction times  Difficulty with your balance, especially while walking, move slowly and carefully, do not make sudden position changes  Difficulty focusing or blurred vision    You may not be aware of slight changes in your behavior and/or your reaction time because of the medication used during and after your procedure.     Report the following to your physician:  Excessive pain, swelling, redness or odor of or around the surgical area  Temperature over 100.5  Nausea and vomiting lasting longer than 4 hours or if unable to take medications  Any signs of decreased circulation or nerve impairment to extremity: change in color, persistent numbness, tingling, coldness or increase pain  Any questions or concerns    IF YOU REPORT TO AN EMERGENCY ROOM, DOCTOR'S OFFICE OR HOSPITAL WITHIN 24 HOURS AFTER YOUR PROCEDURE, BRING THIS SHEET AND YOUR AFTER VISIT SUMMARY WITH YOU AND GIVE IT TO THE PHYSICIAN OR NURSE ATTENDING YOU.

## 2022-11-15 ENCOUNTER — TRANSCRIBE ORDER (OUTPATIENT)
Dept: REGISTRATION | Age: 72
End: 2022-11-15

## 2022-11-15 ENCOUNTER — HOSPITAL ENCOUNTER (OUTPATIENT)
Dept: GENERAL RADIOLOGY | Age: 72
Discharge: HOME | End: 2022-11-15
Payer: MEDICARE

## 2022-11-15 DIAGNOSIS — Z77.090 ASBESTOS EXPOSURE: Primary | ICD-10-CM

## 2022-11-15 DIAGNOSIS — Z77.090 ASBESTOS EXPOSURE: ICD-10-CM

## 2022-11-15 PROCEDURE — 71046 X-RAY EXAM CHEST 2 VIEWS: CPT

## 2022-11-28 ENCOUNTER — TRANSCRIBE ORDER (OUTPATIENT)
Dept: SCHEDULING | Age: 72
End: 2022-11-28

## 2022-11-28 DIAGNOSIS — J84.10 PULMONARY FIBROSIS (HCC): Primary | ICD-10-CM

## 2022-12-14 ENCOUNTER — HOSPITAL ENCOUNTER (OUTPATIENT)
Dept: CT IMAGING | Age: 72
Discharge: HOME | End: 2022-12-14
Attending: INTERNAL MEDICINE
Payer: MEDICARE

## 2022-12-14 DIAGNOSIS — J84.10 PULMONARY FIBROSIS (HCC): ICD-10-CM

## 2022-12-14 PROCEDURE — 71250 CT THORAX DX C-: CPT

## 2023-11-27 ENCOUNTER — TRANSCRIBE ORDERS (OUTPATIENT)
Facility: HOSPITAL | Age: 73
End: 2023-11-27

## 2023-11-27 ENCOUNTER — HOSPITAL ENCOUNTER (OUTPATIENT)
Facility: HOSPITAL | Age: 73
Discharge: HOME | End: 2023-11-30
Payer: MEDICARE

## 2023-11-27 DIAGNOSIS — J84.10 PULMONARY FIBROSIS (HCC): ICD-10-CM

## 2023-11-27 DIAGNOSIS — J84.10 PULMONARY FIBROSIS (HCC): Primary | ICD-10-CM

## 2023-11-27 PROCEDURE — 71046 X-RAY EXAM CHEST 2 VIEWS: CPT

## 2024-01-05 ENCOUNTER — OFFICE VISIT (OUTPATIENT)
Age: 74
End: 2024-01-05

## 2024-01-05 VITALS
DIASTOLIC BLOOD PRESSURE: 85 MMHG | HEART RATE: 82 BPM | RESPIRATION RATE: 16 BRPM | OXYGEN SATURATION: 98 % | BODY MASS INDEX: 31.06 KG/M2 | WEIGHT: 229 LBS | TEMPERATURE: 98.6 F | SYSTOLIC BLOOD PRESSURE: 135 MMHG

## 2024-01-05 DIAGNOSIS — U07.1 COVID-19: Primary | ICD-10-CM

## 2024-01-05 LAB
Lab: ABNORMAL
QC PASS/FAIL: ABNORMAL
SARS-COV-2 RDRP RESP QL NAA+PROBE: POSITIVE

## 2024-01-05 NOTE — PROGRESS NOTES
Ajit Gutiérrez (:  1950) is a 73 y.o. male,New patient, here for evaluation of the following chief complaint(s):  Covid Testing (Positive for covid wants retest and medication. )      ASSESSMENT/PLAN:  Visit Diagnoses and Associated Orders       COVID-19    -  Primary    POCT COVID-19 Rapid, NAAT [BKL686 Custom]                 The patient was called for notification of a POSITIVE test result for COVID-19. The following information was given to the patient:    The COVID-19 test result was positive.  Mild and stable symptoms are managed at home.  Day 0 is your first day of symptoms or a positive test.  Day 1 is the first day after your symptoms started or your test specimen was collected.    If you have COVID-19 or have symptoms  Stay home for at least 5 days and isolate from others in your home.  Wear a well-fitted mask if you must be around others in your home.  End isolation after 5 full days if you are fever-free for 24 hours (without the use of fever-reducing medication) and your symptoms are improving.    If you did NOT have symptoms  End isolation after at least 5 full days after your positive test.  Wear a well-fitted mask for 10 full days any time you are around others inside your home or in public. Do not go to places where you are unable to wear a mask.  If you were severely ill with COVID-19, you should isolate for at least 10 days. Consult your doctor before ending isolation.  Contact your medical provider if symptoms are worsening, such as difficulty breathing.    To prevent spreading COVID  Avoid travel and avoid being around people who are at high risk  Wash hands often with soap and water for at least 20 seconds or alternatively use hand  with at least 60% alcohol content  Cover coughs and sneezes  Wear a mask when around others    For more information visit the CDC website: https://www.cdc.gov/coronavirus/2019-nCoV/index.html      Pt has paxlovid prescribed to him in Fall

## 2024-01-15 ENCOUNTER — OFFICE VISIT (OUTPATIENT)
Age: 74
End: 2024-01-15

## 2024-01-15 VITALS
SYSTOLIC BLOOD PRESSURE: 123 MMHG | DIASTOLIC BLOOD PRESSURE: 81 MMHG | WEIGHT: 229 LBS | RESPIRATION RATE: 18 BRPM | TEMPERATURE: 98.2 F | BODY MASS INDEX: 31.06 KG/M2 | OXYGEN SATURATION: 97 % | HEART RATE: 93 BPM

## 2024-01-15 DIAGNOSIS — U07.1 COVID-19 VIRUS INFECTION: Primary | ICD-10-CM

## 2024-01-15 DIAGNOSIS — J20.9 BRONCHITIS WITH BRONCHOSPASM: ICD-10-CM

## 2024-01-15 RX ORDER — AZITHROMYCIN 250 MG/1
250 TABLET, FILM COATED ORAL SEE ADMIN INSTRUCTIONS
Qty: 6 TABLET | Refills: 0 | Status: SHIPPED | OUTPATIENT
Start: 2024-01-15 | End: 2024-01-20

## 2024-01-15 RX ORDER — METHYLPREDNISOLONE 4 MG/1
TABLET ORAL
Qty: 1 KIT | Refills: 0 | Status: SHIPPED | OUTPATIENT
Start: 2024-01-15

## 2024-01-15 ASSESSMENT — ENCOUNTER SYMPTOMS
CHEST TIGHTNESS: 1
SHORTNESS OF BREATH: 0
COUGH: 1
WHEEZING: 0
HEARTBURN: 0

## 2024-01-15 NOTE — PROGRESS NOTES
Chief Complaint   Patient presents with    Chest Pain     C/o  chest pain due to covid diagnosis. Would like an xray. Was told byPCP to get an xray. Sx started this person.         Cough  This is a new problem. The current episode started in the past 7 days. The problem has been gradually improving. The problem occurs every few minutes. The cough is Non-productive. Associated symptoms include chest pain (mild interminnant on anterior chest on deep breathing and coughing). Pertinent negatives include no chills, ear congestion, fever, heartburn, nasal congestion, postnasal drip, shortness of breath or wheezing. Exacerbated by: deep breathing. Risk factors: recent covid 10 days before. He has tried a beta-agonist inhaler and OTC cough suppressant for the symptoms. The treatment provided significant relief. His past medical history is significant for asthma and bronchitis.       Past Medical History:   Diagnosis Date    Adverse effect of anesthesia     have to be particular with type of anesthesia d/t myasthenia gravis    Asthma     Elevated cholesterol     Hypertension     Ill-defined condition     elevated cholesterol    Myasthenia gravis (HCC)     Seasonal allergic rhinitis     Sleep apnea     mild, no cpap       Past Surgical History:   Procedure Laterality Date    COLONOSCOPY      COLONOSCOPY N/A 8/19/2022    COLONOSCOPY performed by Eligio Asencio Jr., MD at [facility] ENDOSCOPY    COLONOSCOPY N/A 04/03/2019    COLONOSCOPY performed by Eligio Asencio Jr., MD at [facility] ENDOSCOPY    COLONOSCOPY N/A 03/07/2018    COLONOSCOPY performed by Eligio Asencio Jr., MD at [facility] ENDOSCOPY    COLONOSCOPY,BIOPSY  04/03/2019         COLONOSCOPY,BIOPSY  03/07/2018         COLONOSCOPY,REMV LESN,SNARE  04/03/2019         EDG US EXAM SURGICAL ALTER STOM DUODENUM/JEJUNUM  12/20/2016         ENDOSCOPIC ULTRASOUND EXAM  02/07/2018         HEENT      nasal polyps    HERNIA REPAIR      inguinal ? cant remember side

## 2024-01-16 ENCOUNTER — TRANSCRIBE ORDERS (OUTPATIENT)
Facility: HOSPITAL | Age: 74
End: 2024-01-16

## 2024-01-16 DIAGNOSIS — K86.2 PANCREATIC CYST: Primary | ICD-10-CM

## 2024-02-09 ENCOUNTER — HOSPITAL ENCOUNTER (OUTPATIENT)
Facility: HOSPITAL | Age: 74
End: 2024-02-09
Attending: INTERNAL MEDICINE
Payer: MEDICARE

## 2024-02-09 DIAGNOSIS — K86.2 PANCREATIC CYST: ICD-10-CM

## 2024-02-09 PROCEDURE — 6360000004 HC RX CONTRAST MEDICATION: Performed by: INTERNAL MEDICINE

## 2024-02-09 PROCEDURE — A9579 GAD-BASE MR CONTRAST NOS,1ML: HCPCS | Performed by: INTERNAL MEDICINE

## 2024-02-09 PROCEDURE — 74183 MRI ABD W/O CNTR FLWD CNTR: CPT

## 2024-02-09 RX ADMIN — GADOTERIDOL 20 ML: 279.3 INJECTION, SOLUTION INTRAVENOUS at 11:15

## 2024-04-15 ENCOUNTER — HOSPITAL ENCOUNTER (OUTPATIENT)
Facility: HOSPITAL | Age: 74
Discharge: HOME | End: 2024-04-18
Attending: INTERNAL MEDICINE
Payer: MEDICARE

## 2024-04-15 DIAGNOSIS — J84.10 PULMONARY FIBROSIS (HCC): ICD-10-CM

## 2024-04-15 PROCEDURE — 71250 CT THORAX DX C-: CPT

## 2024-05-24 ENCOUNTER — APPOINTMENT (OUTPATIENT)
Facility: HOSPITAL | Age: 74
End: 2024-05-24
Payer: MEDICARE

## 2024-05-24 ENCOUNTER — HOSPITAL ENCOUNTER (EMERGENCY)
Facility: HOSPITAL | Age: 74
Discharge: HOME | End: 2024-05-24
Payer: MEDICARE

## 2024-05-24 VITALS
HEART RATE: 81 BPM | OXYGEN SATURATION: 96 % | SYSTOLIC BLOOD PRESSURE: 129 MMHG | WEIGHT: 229.5 LBS | RESPIRATION RATE: 25 BRPM | TEMPERATURE: 98.1 F | DIASTOLIC BLOOD PRESSURE: 81 MMHG | HEIGHT: 72 IN | BODY MASS INDEX: 31.08 KG/M2

## 2024-05-24 DIAGNOSIS — R06.02 SHORTNESS OF BREATH: ICD-10-CM

## 2024-05-24 DIAGNOSIS — M79.661 RIGHT CALF PAIN: ICD-10-CM

## 2024-05-24 DIAGNOSIS — J01.90 ACUTE SINUSITIS, RECURRENCE NOT SPECIFIED, UNSPECIFIED LOCATION: Primary | ICD-10-CM

## 2024-05-24 LAB
ALBUMIN SERPL-MCNC: 3.5 G/DL (ref 3.5–5)
ALBUMIN/GLOB SERPL: 1.1 (ref 1.1–2.2)
ALP SERPL-CCNC: 70 U/L (ref 45–117)
ALT SERPL-CCNC: 38 U/L (ref 12–78)
ANION GAP SERPL CALC-SCNC: 6 MMOL/L (ref 5–15)
AST SERPL-CCNC: 41 U/L (ref 15–37)
BASOPHILS # BLD: 0 K/UL (ref 0–0.1)
BASOPHILS NFR BLD: 0 % (ref 0–1)
BILIRUB SERPL-MCNC: 0.8 MG/DL (ref 0.2–1)
BUN SERPL-MCNC: 13 MG/DL (ref 6–20)
BUN/CREAT SERPL: 12 (ref 12–20)
CALCIUM SERPL-MCNC: 8.7 MG/DL (ref 8.5–10.1)
CHLORIDE SERPL-SCNC: 108 MMOL/L (ref 97–108)
CO2 SERPL-SCNC: 25 MMOL/L (ref 21–32)
CREAT SERPL-MCNC: 1.1 MG/DL (ref 0.7–1.3)
D DIMER PPP FEU-MCNC: 0.71 MG/L FEU (ref 0–0.65)
DIFFERENTIAL METHOD BLD: ABNORMAL
ECHO BSA: 2.3 M2
EOSINOPHIL # BLD: 0 K/UL (ref 0–0.4)
EOSINOPHIL NFR BLD: 0 % (ref 0–7)
ERYTHROCYTE [DISTWIDTH] IN BLOOD BY AUTOMATED COUNT: 13.5 % (ref 11.5–14.5)
GLOBULIN SER CALC-MCNC: 3.1 G/DL (ref 2–4)
GLUCOSE SERPL-MCNC: 247 MG/DL (ref 65–100)
HCT VFR BLD AUTO: 42.8 % (ref 36.6–50.3)
HGB BLD-MCNC: 14.4 G/DL (ref 12.1–17)
IMM GRANULOCYTES # BLD AUTO: 0 K/UL (ref 0–0.04)
IMM GRANULOCYTES NFR BLD AUTO: 0 % (ref 0–0.5)
LYMPHOCYTES # BLD: 0.5 K/UL (ref 0.8–3.5)
LYMPHOCYTES NFR BLD: 5 % (ref 12–49)
MAGNESIUM SERPL-MCNC: 2 MG/DL (ref 1.6–2.4)
MCH RBC QN AUTO: 29.9 PG (ref 26–34)
MCHC RBC AUTO-ENTMCNC: 33.6 G/DL (ref 30–36.5)
MCV RBC AUTO: 89 FL (ref 80–99)
MONOCYTES # BLD: 0.2 K/UL (ref 0–1)
MONOCYTES NFR BLD: 2 % (ref 5–13)
NEUTS SEG # BLD: 9.8 K/UL (ref 1.8–8)
NEUTS SEG NFR BLD: 93 % (ref 32–75)
NRBC # BLD: 0 K/UL (ref 0–0.01)
NRBC BLD-RTO: 0 PER 100 WBC
PLATELET # BLD AUTO: 203 K/UL (ref 150–400)
PMV BLD AUTO: 9.9 FL (ref 8.9–12.9)
POTASSIUM SERPL-SCNC: 3.6 MMOL/L (ref 3.5–5.1)
PROT SERPL-MCNC: 6.6 G/DL (ref 6.4–8.2)
RBC # BLD AUTO: 4.81 M/UL (ref 4.1–5.7)
RBC MORPH BLD: ABNORMAL
SODIUM SERPL-SCNC: 139 MMOL/L (ref 136–145)
WBC # BLD AUTO: 10.5 K/UL (ref 4.1–11.1)

## 2024-05-24 PROCEDURE — 36415 COLL VENOUS BLD VENIPUNCTURE: CPT

## 2024-05-24 PROCEDURE — 85379 FIBRIN DEGRADATION QUANT: CPT

## 2024-05-24 PROCEDURE — 71046 X-RAY EXAM CHEST 2 VIEWS: CPT

## 2024-05-24 PROCEDURE — 99285 EMERGENCY DEPT VISIT HI MDM: CPT

## 2024-05-24 PROCEDURE — 93970 EXTREMITY STUDY: CPT

## 2024-05-24 PROCEDURE — 71275 CT ANGIOGRAPHY CHEST: CPT

## 2024-05-24 PROCEDURE — 6360000004 HC RX CONTRAST MEDICATION: Performed by: STUDENT IN AN ORGANIZED HEALTH CARE EDUCATION/TRAINING PROGRAM

## 2024-05-24 PROCEDURE — 80053 COMPREHEN METABOLIC PANEL: CPT

## 2024-05-24 PROCEDURE — 85025 COMPLETE CBC W/AUTO DIFF WBC: CPT

## 2024-05-24 PROCEDURE — 83735 ASSAY OF MAGNESIUM: CPT

## 2024-05-24 RX ADMIN — IOPAMIDOL 100 ML: 755 INJECTION, SOLUTION INTRAVENOUS at 19:52

## 2024-05-24 ASSESSMENT — ENCOUNTER SYMPTOMS
SHORTNESS OF BREATH: 1
EYES NEGATIVE: 1
COUGH: 1
SINUS PRESSURE: 1
GASTROINTESTINAL NEGATIVE: 1
ALLERGIC/IMMUNOLOGIC NEGATIVE: 1
SINUS PAIN: 1

## 2024-05-24 ASSESSMENT — PAIN SCALES - GENERAL: PAINLEVEL_OUTOF10: 0

## 2024-05-24 ASSESSMENT — LIFESTYLE VARIABLES
HOW MANY STANDARD DRINKS CONTAINING ALCOHOL DO YOU HAVE ON A TYPICAL DAY: PATIENT DOES NOT DRINK
HOW OFTEN DO YOU HAVE A DRINK CONTAINING ALCOHOL: NEVER

## 2024-05-24 NOTE — ED PROVIDER NOTES
Granulocytes Absolute 0.0 0.00 - 0.04 K/UL    Differential Type SMEAR SCANNED      RBC Comment NORMOCYTIC, NORMOCHROMIC     Comprehensive Metabolic Panel    Collection Time: 05/24/24  3:52 PM   Result Value Ref Range    Sodium 139 136 - 145 mmol/L    Potassium 3.6 3.5 - 5.1 mmol/L    Chloride 108 97 - 108 mmol/L    CO2 25 21 - 32 mmol/L    Anion Gap 6 5 - 15 mmol/L    Glucose 247 (H) 65 - 100 mg/dL    BUN 13 6 - 20 MG/DL    Creatinine 1.10 0.70 - 1.30 MG/DL    BUN/Creatinine Ratio 12 12 - 20      Est, Glom Filt Rate 71 >60 ml/min/1.73m2    Calcium 8.7 8.5 - 10.1 MG/DL    Total Bilirubin 0.8 0.2 - 1.0 MG/DL    ALT 38 12 - 78 U/L    AST 41 (H) 15 - 37 U/L    Alk Phosphatase 70 45 - 117 U/L    Total Protein 6.6 6.4 - 8.2 g/dL    Albumin 3.5 3.5 - 5.0 g/dL    Globulin 3.1 2.0 - 4.0 g/dL    Albumin/Globulin Ratio 1.1 1.1 - 2.2     Magnesium    Collection Time: 05/24/24  3:52 PM   Result Value Ref Range    Magnesium 2.0 1.6 - 2.4 mg/dL   D-Dimer, Quantitative    Collection Time: 05/24/24  6:22 PM   Result Value Ref Range    D-Dimer, Quant 0.71 (H) 0.00 - 0.65 mg/L FEU   Vascular duplex lower extremity venous bilateral    Collection Time: 05/24/24  7:39 PM   Result Value Ref Range    Body Surface Area 2.3 m2       RADIOLOGY:  Non-plain film images such as CT, Ultrasound and MRI are read by the radiologist. Plain radiographic images are visualized and preliminarily interpreted by myself with the below findings:          Interpretation per the Radiologist below, if available at the time of this note:     CTA CHEST W WO CONTRAST    Result Date: 5/24/2024  EXAM:  CTA CHEST W WO CONTRAST INDICATION: elevated dimer and sob COMPARISON: Chest radiograph 5/24/2024, CT chest 4/15/2024 TECHNIQUE: Helical thin section chest CT following uneventful intravenous administration of nonionic contrast 100 mL of isovue 370 according to departmental PE protocol. Coronal and sagittal reformats were performed. 3D post processing was performed.

## 2024-05-26 LAB
EKG ATRIAL RATE: 95 BPM
EKG DIAGNOSIS: NORMAL
EKG P AXIS: 53 DEGREES
EKG P-R INTERVAL: 126 MS
EKG Q-T INTERVAL: 350 MS
EKG QRS DURATION: 86 MS
EKG QTC CALCULATION (BAZETT): 439 MS
EKG R AXIS: 15 DEGREES
EKG T AXIS: 25 DEGREES
EKG VENTRICULAR RATE: 95 BPM

## 2024-06-03 ENCOUNTER — OFFICE VISIT (OUTPATIENT)
Age: 74
End: 2024-06-03

## 2024-06-03 VITALS
OXYGEN SATURATION: 96 % | SYSTOLIC BLOOD PRESSURE: 131 MMHG | WEIGHT: 229 LBS | TEMPERATURE: 98.5 F | DIASTOLIC BLOOD PRESSURE: 78 MMHG | RESPIRATION RATE: 17 BRPM | BODY MASS INDEX: 31.06 KG/M2 | HEART RATE: 86 BPM

## 2024-06-03 DIAGNOSIS — S22.31XA CLOSED FRACTURE OF ONE RIB OF RIGHT SIDE, INITIAL ENCOUNTER: ICD-10-CM

## 2024-06-03 DIAGNOSIS — T14.90XA INJURY: Primary | ICD-10-CM

## 2024-06-03 NOTE — PROGRESS NOTES
Subjective: (As above and below)     The patient/guardian gave verbal consent to treat.        Chief Complaint   Patient presents with    Rib Injury     C/o possible cracked ribs       Ajit Gutiérrez is a 73 y.o. male who presents for evaluation of : right rib injury hurt on concret this am hurt right lower rib, pain and bruise tenderness. Denies fall incident happen while trying to clean cat litter.   HPI      Review of Systems    Review of Systems - negative except as listed above    Reviewed PmHx, RxHx, FmHx, SocHx, AllgHx and updated in chart.  Family History   Problem Relation Age of Onset    Colon Cancer Maternal Uncle     Lung Disease Father         mesothelioma    Cancer Mother         colon    Diabetes Sister     Heart Disease Father        Past Medical History:   Diagnosis Date    Adverse effect of anesthesia     have to be particular with type of anesthesia d/t myasthenia gravis    Asthma     Elevated cholesterol     Hypertension     Ill-defined condition     elevated cholesterol    Myasthenia gravis (HCC)     Seasonal allergic rhinitis     Sleep apnea     mild, no cpap      Social History     Socioeconomic History    Marital status:      Spouse name: None    Number of children: None    Years of education: None    Highest education level: None   Tobacco Use    Smoking status: Never     Passive exposure: Never    Smokeless tobacco: Never   Substance and Sexual Activity    Alcohol use: Yes     Alcohol/week: 1.0 - 2.0 standard drink of alcohol    Drug use: No          Current Outpatient Medications   Medication Sig    albuterol sulfate HFA (PROVENTIL;VENTOLIN;PROAIR) 108 (90 Base) MCG/ACT inhaler Inhale 2 puffs into the lungs every 4 hours as needed    atorvastatin (LIPITOR) 10 MG tablet Take 1 tablet by mouth daily    Azelastine-Fluticasone 137-50 MCG/ACT SUSP by Nasal route as needed    budesonide-formoterol (SYMBICORT) 160-4.5 MCG/ACT AERO Inhale 2 puffs into the lungs 2 times daily as

## 2024-06-05 ENCOUNTER — OFFICE VISIT (OUTPATIENT)
Age: 74
End: 2024-06-05
Payer: MEDICARE

## 2024-06-05 VITALS — BODY MASS INDEX: 31.05 KG/M2 | HEIGHT: 72 IN

## 2024-06-05 DIAGNOSIS — S22.31XA CLOSED FRACTURE OF ONE RIB OF RIGHT SIDE, INITIAL ENCOUNTER: Primary | ICD-10-CM

## 2024-06-05 PROCEDURE — 99204 OFFICE O/P NEW MOD 45 MIN: CPT | Performed by: PHYSICIAN ASSISTANT

## 2024-06-05 PROCEDURE — 1036F TOBACCO NON-USER: CPT | Performed by: PHYSICIAN ASSISTANT

## 2024-06-05 PROCEDURE — 3017F COLORECTAL CA SCREEN DOC REV: CPT | Performed by: PHYSICIAN ASSISTANT

## 2024-06-05 PROCEDURE — G8427 DOCREV CUR MEDS BY ELIG CLIN: HCPCS | Performed by: PHYSICIAN ASSISTANT

## 2024-06-05 PROCEDURE — G8417 CALC BMI ABV UP PARAM F/U: HCPCS | Performed by: PHYSICIAN ASSISTANT

## 2024-06-05 PROCEDURE — 1123F ACP DISCUSS/DSCN MKR DOCD: CPT | Performed by: PHYSICIAN ASSISTANT

## 2024-06-05 RX ORDER — TRAMADOL HYDROCHLORIDE 50 MG/1
50 TABLET ORAL EVERY 6 HOURS PRN
Qty: 28 TABLET | Refills: 0 | Status: SHIPPED | OUTPATIENT
Start: 2024-06-05 | End: 2024-06-12

## 2024-06-05 RX ORDER — LIDOCAINE 4 G/G
1 PATCH TOPICAL DAILY
Qty: 30 PATCH | Refills: 0 | Status: SHIPPED | OUTPATIENT
Start: 2024-06-05 | End: 2024-07-05

## 2024-06-05 ASSESSMENT — PATIENT HEALTH QUESTIONNAIRE - PHQ9
1. LITTLE INTEREST OR PLEASURE IN DOING THINGS: NOT AT ALL
2. FEELING DOWN, DEPRESSED OR HOPELESS: SEVERAL DAYS
SUM OF ALL RESPONSES TO PHQ QUESTIONS 1-9: 1
SUM OF ALL RESPONSES TO PHQ9 QUESTIONS 1 & 2: 1
SUM OF ALL RESPONSES TO PHQ QUESTIONS 1-9: 1

## 2024-06-05 NOTE — PROGRESS NOTES
Identified pt with two pt identifiers (name and ). Reviewed chart in preparation for visit and have obtained necessary documentation.    Ajit Gutiérrez is a 73 y.o. male Pain (Rib)  .    Vitals:    24 1030   Height: 1.829 m (6' 0.01\")          1. Have you been to the ER, urgent care clinic since your last visit?  Hospitalized since your last visit?  yes - ER     2. Have you seen or consulted any other health care providers outside of the [facility] since your last visit?  Include any pap smears or colon screening.  no  
visit.       Well appearing, ambulatory with a steady gait. There is moderate ecchymosis and tenderness  of the lateral chest wall in the distribution of the 8-11th ribs. No step off, crepitus or deformity. No flail rib. He is breathing at an unlabored, regular rate.          Diagnostics:    Pertinent Diagnostics: External xrays of the right ribs are available from the ED, they show a non displaced 9th rib fracture. No pneumothorax, effusion, or infiltrates.     Assessment: non-displaced rib fracture  Plan:  He has an isolated rib fracture, his pain is well controlled with no signs of respiratory distress, he has no sign of complication, including pnuemothorax, occult fractures, atelectasis. He discussed the isolated rib fracture treatment is largely symptomatic, and that pain control and full respirations are tantamount to avoid complications. He was instructed on return precautions to signs of complication on an urgent basis, he will follow in 3 weeks if he is having any continued symptoms. No xrays.       Mr. Gutiérrez has a reminder for a \"due or due soon\" health maintenance. I have asked that he contact his primary care provider for follow-up on this health maintenance.

## 2024-06-08 ENCOUNTER — TRANSCRIBE ORDERS (OUTPATIENT)
Facility: HOSPITAL | Age: 74
End: 2024-06-08

## 2024-06-08 DIAGNOSIS — J84.10 PULMONARY FIBROSIS (HCC): Primary | ICD-10-CM

## 2024-10-23 ENCOUNTER — HOSPITAL ENCOUNTER (OUTPATIENT)
Facility: HOSPITAL | Age: 74
Discharge: HOME | End: 2024-10-26
Attending: INTERNAL MEDICINE
Payer: MEDICARE

## 2024-10-23 DIAGNOSIS — J84.10 PULMONARY FIBROSIS (HCC): ICD-10-CM

## 2024-10-23 PROCEDURE — 71250 CT THORAX DX C-: CPT

## 2024-12-03 ENCOUNTER — TRANSCRIBE ORDERS (OUTPATIENT)
Facility: HOSPITAL | Age: 74
End: 2024-12-03

## 2024-12-03 DIAGNOSIS — Z96.653 PAIN OF BOTH LOWER EXTREMITIES DUE TO BILATERAL TOTAL KNEE REPLACEMENTS, INITIAL ENCOUNTER (HCC): Primary | ICD-10-CM

## 2024-12-03 DIAGNOSIS — T84.84XA PAIN OF BOTH LOWER EXTREMITIES DUE TO BILATERAL TOTAL KNEE REPLACEMENTS, INITIAL ENCOUNTER (HCC): Primary | ICD-10-CM

## 2024-12-08 ENCOUNTER — HOSPITAL ENCOUNTER (EMERGENCY)
Facility: HOSPITAL | Age: 74
Discharge: HOME | End: 2024-12-08
Attending: EMERGENCY MEDICINE
Payer: MEDICARE

## 2024-12-08 ENCOUNTER — APPOINTMENT (OUTPATIENT)
Facility: HOSPITAL | Age: 74
End: 2024-12-08
Payer: MEDICARE

## 2024-12-08 VITALS
TEMPERATURE: 97.4 F | DIASTOLIC BLOOD PRESSURE: 79 MMHG | OXYGEN SATURATION: 93 % | HEART RATE: 77 BPM | SYSTOLIC BLOOD PRESSURE: 138 MMHG | RESPIRATION RATE: 14 BRPM | BODY MASS INDEX: 30.51 KG/M2 | WEIGHT: 225 LBS

## 2024-12-08 DIAGNOSIS — S16.1XXA STRAIN OF NECK MUSCLE, INITIAL ENCOUNTER: ICD-10-CM

## 2024-12-08 DIAGNOSIS — S09.90XA CLOSED HEAD INJURY, INITIAL ENCOUNTER: Primary | ICD-10-CM

## 2024-12-08 LAB
ALBUMIN SERPL-MCNC: 3.8 G/DL (ref 3.5–5)
ALBUMIN/GLOB SERPL: 1.3 (ref 1.1–2.2)
ALP SERPL-CCNC: 76 U/L (ref 45–117)
ALT SERPL-CCNC: 33 U/L (ref 12–78)
ANION GAP SERPL CALC-SCNC: 8 MMOL/L (ref 2–12)
AST SERPL-CCNC: 28 U/L (ref 15–37)
BASOPHILS # BLD: 0 K/UL (ref 0–0.1)
BASOPHILS NFR BLD: 0 % (ref 0–1)
BILIRUB SERPL-MCNC: 0.8 MG/DL (ref 0.2–1)
BUN SERPL-MCNC: 14 MG/DL (ref 6–20)
BUN/CREAT SERPL: 17 (ref 12–20)
CALCIUM SERPL-MCNC: 8.9 MG/DL (ref 8.5–10.1)
CHLORIDE SERPL-SCNC: 106 MMOL/L (ref 97–108)
CO2 SERPL-SCNC: 24 MMOL/L (ref 21–32)
CREAT SERPL-MCNC: 0.83 MG/DL (ref 0.7–1.3)
DIFFERENTIAL METHOD BLD: ABNORMAL
EOSINOPHIL # BLD: 0.2 K/UL (ref 0–0.4)
EOSINOPHIL NFR BLD: 3 % (ref 0–7)
ERYTHROCYTE [DISTWIDTH] IN BLOOD BY AUTOMATED COUNT: 13.3 % (ref 11.5–14.5)
GLOBULIN SER CALC-MCNC: 3 G/DL (ref 2–4)
GLUCOSE SERPL-MCNC: 151 MG/DL (ref 65–100)
HCT VFR BLD AUTO: 39.6 % (ref 36.6–50.3)
HGB BLD-MCNC: 13.8 G/DL (ref 12.1–17)
IMM GRANULOCYTES # BLD AUTO: 0.1 K/UL (ref 0–0.04)
IMM GRANULOCYTES NFR BLD AUTO: 1 % (ref 0–0.5)
LYMPHOCYTES # BLD: 1.2 K/UL (ref 0.8–3.5)
LYMPHOCYTES NFR BLD: 16 % (ref 12–49)
MCH RBC QN AUTO: 29.7 PG (ref 26–34)
MCHC RBC AUTO-ENTMCNC: 34.8 G/DL (ref 30–36.5)
MCV RBC AUTO: 85.2 FL (ref 80–99)
MONOCYTES # BLD: 0.7 K/UL (ref 0–1)
MONOCYTES NFR BLD: 9 % (ref 5–13)
NEUTS SEG # BLD: 5.4 K/UL (ref 1.8–8)
NEUTS SEG NFR BLD: 71 % (ref 32–75)
NRBC # BLD: 0 K/UL (ref 0–0.01)
NRBC BLD-RTO: 0 PER 100 WBC
PLATELET # BLD AUTO: 193 K/UL (ref 150–400)
PMV BLD AUTO: 9.5 FL (ref 8.9–12.9)
POTASSIUM SERPL-SCNC: 2.9 MMOL/L (ref 3.5–5.1)
PROT SERPL-MCNC: 6.8 G/DL (ref 6.4–8.2)
RBC # BLD AUTO: 4.65 M/UL (ref 4.1–5.7)
SODIUM SERPL-SCNC: 138 MMOL/L (ref 136–145)
WBC # BLD AUTO: 7.6 K/UL (ref 4.1–11.1)

## 2024-12-08 PROCEDURE — 99284 EMERGENCY DEPT VISIT MOD MDM: CPT

## 2024-12-08 PROCEDURE — 93005 ELECTROCARDIOGRAM TRACING: CPT | Performed by: EMERGENCY MEDICINE

## 2024-12-08 PROCEDURE — 72125 CT NECK SPINE W/O DYE: CPT

## 2024-12-08 PROCEDURE — 6370000000 HC RX 637 (ALT 250 FOR IP): Performed by: EMERGENCY MEDICINE

## 2024-12-08 PROCEDURE — 85025 COMPLETE CBC W/AUTO DIFF WBC: CPT

## 2024-12-08 PROCEDURE — 80053 COMPREHEN METABOLIC PANEL: CPT

## 2024-12-08 PROCEDURE — 36415 COLL VENOUS BLD VENIPUNCTURE: CPT

## 2024-12-08 PROCEDURE — 70450 CT HEAD/BRAIN W/O DYE: CPT

## 2024-12-08 RX ORDER — LIDOCAINE 50 MG/G
1 PATCH TOPICAL DAILY
Qty: 10 PATCH | Refills: 0 | Status: SHIPPED | OUTPATIENT
Start: 2024-12-08 | End: 2024-12-08

## 2024-12-08 RX ORDER — LIDOCAINE 50 MG/G
1 PATCH TOPICAL DAILY
Qty: 10 PATCH | Refills: 0 | Status: SHIPPED | OUTPATIENT
Start: 2024-12-08 | End: 2024-12-18

## 2024-12-08 RX ORDER — OXYCODONE HYDROCHLORIDE 5 MG/1
5 TABLET ORAL ONCE
Status: COMPLETED | OUTPATIENT
Start: 2024-12-08 | End: 2024-12-08

## 2024-12-08 RX ORDER — OXYCODONE HYDROCHLORIDE 5 MG/1
5 TABLET ORAL EVERY 6 HOURS PRN
Qty: 8 TABLET | Refills: 0 | Status: SHIPPED | OUTPATIENT
Start: 2024-12-08 | End: 2024-12-08

## 2024-12-08 RX ORDER — ACETAMINOPHEN 500 MG
1000 TABLET ORAL ONCE
Status: COMPLETED | OUTPATIENT
Start: 2024-12-08 | End: 2024-12-08

## 2024-12-08 RX ORDER — OXYCODONE HYDROCHLORIDE 5 MG/1
5 TABLET ORAL EVERY 6 HOURS PRN
Qty: 8 TABLET | Refills: 0 | Status: SHIPPED | OUTPATIENT
Start: 2024-12-08 | End: 2024-12-11

## 2024-12-08 RX ADMIN — POTASSIUM BICARBONATE 20 MEQ: 782 TABLET, EFFERVESCENT ORAL at 18:47

## 2024-12-08 RX ADMIN — ACETAMINOPHEN 1000 MG: 500 TABLET ORAL at 18:15

## 2024-12-08 RX ADMIN — OXYCODONE 5 MG: 5 TABLET ORAL at 18:14

## 2024-12-08 ASSESSMENT — PAIN SCALES - GENERAL
PAINLEVEL_OUTOF10: 8
PAINLEVEL_OUTOF10: 10
PAINLEVEL_OUTOF10: 8

## 2024-12-08 ASSESSMENT — PAIN DESCRIPTION - LOCATION
LOCATION: NECK
LOCATION: NECK

## 2024-12-08 ASSESSMENT — PAIN - FUNCTIONAL ASSESSMENT
PAIN_FUNCTIONAL_ASSESSMENT: 0-10
PAIN_FUNCTIONAL_ASSESSMENT: ACTIVITIES ARE NOT PREVENTED
PAIN_FUNCTIONAL_ASSESSMENT: 0-10
PAIN_FUNCTIONAL_ASSESSMENT: 0-10

## 2024-12-08 ASSESSMENT — PAIN DESCRIPTION - DESCRIPTORS: DESCRIPTORS: OTHER (COMMENT)

## 2024-12-08 ASSESSMENT — PAIN DESCRIPTION - ONSET: ONSET: ON-GOING

## 2024-12-08 ASSESSMENT — PAIN DESCRIPTION - FREQUENCY: FREQUENCY: CONTINUOUS

## 2024-12-08 ASSESSMENT — PAIN DESCRIPTION - PAIN TYPE: TYPE: ACUTE PAIN

## 2024-12-08 NOTE — DISCHARGE INSTR - COC
List:[id number]}    Rehab Therapies: {THERAPEUTIC INTERVENTION:7166998913}  Weight Bearing Status/Restrictions: { CC Weight Bearin}  Other Medical Equipment (for information only, NOT a DME order):  {EQUIPMENT:061728804}  Other Treatments: ***    Patient's personal belongings (please select all that are sent with patient):  {CHP DME Belongings:984341443}    RN SIGNATURE:  {Esignature:597108112}    CASE MANAGEMENT/SOCIAL WORK SECTION    Inpatient Status Date: ***    Readmission Risk Assessment Score:  Readmission Risk              Risk of Unplanned Readmission:  0           Discharging to Facility/ Agency   Name:   Address:  Phone:  Fax:    Dialysis Facility (if applicable)   Name:  Address:  Dialysis Schedule:  Phone:  Fax:    / signature: {Esignature:576869172}    PHYSICIAN SECTION    Prognosis: {Prognosis:7050832119}    Condition at Discharge: { Patient Condition:295982173}    Rehab Potential (if transferring to Rehab): {Prognosis:2641455857}    Recommended Labs or Other Treatments After Discharge: ***    Physician Certification: I certify the above information and transfer of Ajit Gutiérrez  is necessary for the continuing treatment of the diagnosis listed and that he requires {Admit to Appropriate Level of Care:52564} for {GREATER/LESS:174130071} 30 days.     Update Admission H&P: {CHP DME Changes in HandP:688155892}    PHYSICIAN SIGNATURE:  {Esignature:435945371}

## 2024-12-08 NOTE — ED PROVIDER NOTES
[facility] EMERGENCY DEPT  EMERGENCY DEPARTMENT ENCOUNTER       Pt Name: Ajit Gutiérrez  MRN: [id number]  Birthdate 1950  Date of evaluation: 12/8/2024  Provider: Srinath Goddard DO   PCP: Albert Juarez MD  Note Started: 5:36 PM EST 12/8/24     CHIEF COMPLAINT       Chief Complaint   Patient presents with    Fall     Pt presents to ER from home via wheelchair after a mechanical fall down x4 stairs. Pt reports he hit his head and twisted his neck, C-collar applied in triage. Pt denies any blood thinners or LOC but reports he has started to feel dizzy/ near syncopal post fall         HISTORY OF PRESENT ILLNESS: 1 or more elements      History From: Patient, History limited by: none     Ajit Gutiérrez is a 74 y.o. male presenting the emergency department with a fall, reports going down the steps and tripped and fell, hit his head, did not lose consciousness, Naini blood thinners, does complain of neck pain, no radicular symptoms.       Please See [facility] for Additional Details of the HPI/PMH  Nursing Notes were all reviewed and agreed with or any disagreements were addressed in the HPI.     REVIEW OF SYSTEMS        Positives and Pertinent negatives as per HPI.    PAST HISTORY     Past Medical History:  Past Medical History:   Diagnosis Date    Adverse effect of anesthesia     have to be particular with type of anesthesia d/t myasthenia gravis    Asthma     Elevated cholesterol     Hypertension     Ill-defined condition     elevated cholesterol    Myasthenia gravis (HCC)     Seasonal allergic rhinitis     Sleep apnea     mild, no cpap       Past Surgical History:  Past Surgical History:   Procedure Laterality Date    COLONOSCOPY      COLONOSCOPY N/A 8/19/2022    COLONOSCOPY performed by Eligio Asencio Jr., MD at [facility] ENDOSCOPY    COLONOSCOPY N/A 04/03/2019    COLONOSCOPY performed by Eligio Asencio Jr., MD at [facility] ENDOSCOPY    COLONOSCOPY N/A 03/07/2018    COLONOSCOPY performed by Eligio

## 2024-12-09 NOTE — ED NOTES
Patient discharged from the ED by Do Goddard. Diagnosis, medications, precautions and follow-ups were reviewed with the patient/family. Questions were asked and answered prior to departure. Patient departed the ED via wheelchair and was accompanied by wife.

## 2024-12-09 NOTE — ED NOTES
Report received from BRITTNEE Mckeon. Reviewed reason for patient arrival, vitals, labs, medications, orders, procedures, results, pending orders/results and current plan for disposition. Questions were asked and answered prior to departure.

## 2024-12-09 NOTE — ED NOTES
Bedside and Verbal shift change report given to Alyson RN (oncoming nurse) by Linda RN (offgoing nurse). Report included the following information Nurse Handoff Report, ED Encounter Summary, ED SBAR, and MAR.

## 2024-12-10 LAB
EKG ATRIAL RATE: 87 BPM
EKG DIAGNOSIS: NORMAL
EKG P AXIS: 47 DEGREES
EKG P-R INTERVAL: 116 MS
EKG Q-T INTERVAL: 394 MS
EKG QRS DURATION: 88 MS
EKG QTC CALCULATION (BAZETT): 474 MS
EKG R AXIS: 16 DEGREES
EKG T AXIS: 8 DEGREES
EKG VENTRICULAR RATE: 87 BPM

## 2024-12-12 ENCOUNTER — APPOINTMENT (OUTPATIENT)
Facility: HOSPITAL | Age: 74
End: 2024-12-12
Payer: MEDICARE

## 2024-12-12 ENCOUNTER — HOSPITAL ENCOUNTER (EMERGENCY)
Facility: HOSPITAL | Age: 74
Discharge: HOME | End: 2024-12-12
Attending: EMERGENCY MEDICINE
Payer: MEDICARE

## 2024-12-12 VITALS
SYSTOLIC BLOOD PRESSURE: 162 MMHG | BODY MASS INDEX: 30.48 KG/M2 | HEIGHT: 72 IN | OXYGEN SATURATION: 98 % | DIASTOLIC BLOOD PRESSURE: 85 MMHG | WEIGHT: 225 LBS | HEART RATE: 97 BPM | RESPIRATION RATE: 18 BRPM | TEMPERATURE: 98.5 F

## 2024-12-12 DIAGNOSIS — R51.9 ACUTE NONINTRACTABLE HEADACHE, UNSPECIFIED HEADACHE TYPE: Primary | ICD-10-CM

## 2024-12-12 PROCEDURE — 2580000003 HC RX 258

## 2024-12-12 PROCEDURE — 6360000002 HC RX W HCPCS

## 2024-12-12 PROCEDURE — 99284 EMERGENCY DEPT VISIT MOD MDM: CPT

## 2024-12-12 PROCEDURE — 6370000000 HC RX 637 (ALT 250 FOR IP)

## 2024-12-12 PROCEDURE — 96365 THER/PROPH/DIAG IV INF INIT: CPT

## 2024-12-12 PROCEDURE — 96375 TX/PRO/DX INJ NEW DRUG ADDON: CPT

## 2024-12-12 PROCEDURE — 70450 CT HEAD/BRAIN W/O DYE: CPT

## 2024-12-12 PROCEDURE — 96374 THER/PROPH/DIAG INJ IV PUSH: CPT

## 2024-12-12 RX ORDER — METOCLOPRAMIDE HYDROCHLORIDE 5 MG/ML
5 INJECTION INTRAMUSCULAR; INTRAVENOUS EVERY 6 HOURS
Status: DISCONTINUED | OUTPATIENT
Start: 2024-12-12 | End: 2024-12-12 | Stop reason: HOSPADM

## 2024-12-12 RX ORDER — MAGNESIUM SULFATE IN WATER 40 MG/ML
2000 INJECTION, SOLUTION INTRAVENOUS
Status: COMPLETED | OUTPATIENT
Start: 2024-12-12 | End: 2024-12-12

## 2024-12-12 RX ORDER — BUTALBITAL, ACETAMINOPHEN AND CAFFEINE 300; 40; 50 MG/1; MG/1; MG/1
1 CAPSULE ORAL EVERY 4 HOURS PRN
Qty: 30 CAPSULE | Refills: 0 | Status: SHIPPED | OUTPATIENT
Start: 2024-12-12

## 2024-12-12 RX ORDER — 0.9 % SODIUM CHLORIDE 0.9 %
1000 INTRAVENOUS SOLUTION INTRAVENOUS ONCE
Status: COMPLETED | OUTPATIENT
Start: 2024-12-12 | End: 2024-12-12

## 2024-12-12 RX ORDER — BUTALBITAL, ACETAMINOPHEN AND CAFFEINE 50; 325; 40 MG/1; MG/1; MG/1
1 TABLET ORAL
Status: COMPLETED | OUTPATIENT
Start: 2024-12-12 | End: 2024-12-12

## 2024-12-12 RX ORDER — DEXAMETHASONE SODIUM PHOSPHATE 10 MG/ML
10 INJECTION, SOLUTION INTRAMUSCULAR; INTRAVENOUS ONCE
Status: COMPLETED | OUTPATIENT
Start: 2024-12-12 | End: 2024-12-12

## 2024-12-12 RX ADMIN — MAGNESIUM SULFATE HEPTAHYDRATE 2000 MG: 40 INJECTION, SOLUTION INTRAVENOUS at 05:50

## 2024-12-12 RX ADMIN — METOCLOPRAMIDE 5 MG: 5 INJECTION, SOLUTION INTRAMUSCULAR; INTRAVENOUS at 05:32

## 2024-12-12 RX ADMIN — DEXAMETHASONE SODIUM PHOSPHATE 10 MG: 10 INJECTION, SOLUTION INTRAMUSCULAR; INTRAVENOUS at 05:39

## 2024-12-12 RX ADMIN — SODIUM CHLORIDE 1000 ML: 9 INJECTION, SOLUTION INTRAVENOUS at 05:42

## 2024-12-12 RX ADMIN — BUTALBITAL, ACETAMINOPHEN, AND CAFFEINE 1 TABLET: 50; 325; 40 TABLET ORAL at 05:31

## 2024-12-12 ASSESSMENT — ENCOUNTER SYMPTOMS
NAUSEA: 0
PHOTOPHOBIA: 0
SHORTNESS OF BREATH: 0
BACK PAIN: 0
VOMITING: 0

## 2024-12-12 ASSESSMENT — PAIN DESCRIPTION - PAIN TYPE
TYPE: ACUTE PAIN
TYPE: ACUTE PAIN

## 2024-12-12 ASSESSMENT — PAIN - FUNCTIONAL ASSESSMENT: PAIN_FUNCTIONAL_ASSESSMENT: 0-10

## 2024-12-12 ASSESSMENT — PAIN SCALES - GENERAL
PAINLEVEL_OUTOF10: 3
PAINLEVEL_OUTOF10: 10

## 2024-12-12 ASSESSMENT — PAIN DESCRIPTION - LOCATION: LOCATION: HEAD

## 2024-12-12 ASSESSMENT — PAIN DESCRIPTION - FREQUENCY
FREQUENCY: CONTINUOUS
FREQUENCY: CONTINUOUS

## 2024-12-12 ASSESSMENT — LIFESTYLE VARIABLES
HOW OFTEN DO YOU HAVE A DRINK CONTAINING ALCOHOL: MONTHLY OR LESS
HOW MANY STANDARD DRINKS CONTAINING ALCOHOL DO YOU HAVE ON A TYPICAL DAY: 1 OR 2

## 2024-12-12 ASSESSMENT — PAIN DESCRIPTION - DESCRIPTORS
DESCRIPTORS: PRESSURE
DESCRIPTORS: PRESSURE;DULL

## 2024-12-12 ASSESSMENT — PAIN DESCRIPTION - ONSET
ONSET: ON-GOING
ONSET: PROGRESSIVE

## 2024-12-12 NOTE — ED NOTES
Patient discharged from the ED by Dr. Christian. Diagnosis, medications, precautions and follow-ups were reviewed with the patient/family. Questions were asked and answered prior to departure. Patient departed the ED via wheelchair and was accompanied by his wife.

## 2024-12-12 NOTE — DISCHARGE INSTRUCTIONS
You were seen emergency department today for a headache.  Your imaging is negative.  I prescribed you Fioricet.  Please return the emergency department if you develop any worsening headache, vision changes, trouble walking, your condition worsens or you are concerned.

## 2024-12-12 NOTE — ED PROVIDER NOTES
EMERGENCY DEPARTMENT HISTORY AND PHYSICAL EXAM     ----------------------------------------------------------------------------  Please note that this dictation was completed with [employer], the [employer] voice recognition software.  Quite often unanticipated grammatical, syntax, homophones, and other interpretive errors are inadvertently transcribed by the computer software.  Please disregard these errors.  Please excuse any errors that have escaped final proofreading  ----------------------------------------------------------------------------      Date: 12/12/2024  Patient Name: Ajit Gutiérrez      HISTORY OF PRESENT ILLNESS     No chief complaint on file.      History obtainted from:  Patient***    Other independent source of history: ***    HPI: Ajit Gutiérrez is a 74 y.o. male, with significant pmhx of ***, who presents via private vehicle / EMS*** to the ED with c/o ***      PCP: Albert Juarez MD    Allergy List:   No Known Allergies      CURRENT MEDICATIONS      Previous Medications    ALBUTEROL SULFATE HFA (PROVENTIL;VENTOLIN;PROAIR) 108 (90 BASE) MCG/ACT INHALER    Inhale 2 puffs into the lungs every 4 hours as needed    ATORVASTATIN (LIPITOR) 10 MG TABLET    Take 1 tablet by mouth daily    AZELASTINE-FLUTICASONE 137-50 MCG/ACT SUSP    by Nasal route as needed    BUDESONIDE-FORMOTEROL (SYMBICORT) 160-4.5 MCG/ACT AERO    Inhale 2 puffs into the lungs 2 times daily as needed    CETIRIZINE HCL 10 MG CAPS    Take by mouth daily    HYDROCHLOROTHIAZIDE (HYDRODIURIL) 25 MG TABLET    Take 1 tablet by mouth daily    LIDOCAINE (LIDODERM) 5 %    Place 1 patch onto the skin daily for 10 days 12 hours on, 12 hours off.    LISINOPRIL (PRINIVIL;ZESTRIL) 10 MG TABLET    Take 1 tablet by mouth daily    MELATONIN 5 MG TABS TABLET    Take 2 tablets by mouth    MELOXICAM (MOBIC) 7.5 MG TABLET    Take 1 tablet by mouth daily as needed    MYCOPHENOLATE (CELLCEPT) 500 MG TABLET    Take 2 tablets by mouth 2 times 
reviewed the vital signs, available nursing notes, past medical history, past surgical history, family history and social history.    Vital Signs-Reviewed the patient's vital signs.  Patient Vitals for the past 12 hrs:   Temp Pulse Resp BP SpO2   12/12/24 0452 98.4 °F (36.9 °C) 96 18 (!) 164/84 97 %       Records Reviewed: Nursing records and medical records reviewed    MDM:    Medical Decision Making  Amount and/or Complexity of Data Reviewed  Radiology: ordered.    Risk  Prescription drug management.          Provider Notes (Medical Decision Making):   Patient is a 74-year-old male with past medical history significant for sleep apnea, hypertension, asthma presenting for headache.  On physical exam, vital signs are stable.  Patient has scattered abrasions to his head along with a hematoma over his right scalp.  Pupils are 3 mm, equal and reactive.  Cranial nerves II through XII intact.  No numbness or weakness noted.  Differential diagnosis includes rebound headache, ICH, concussion.  Here, we will get a CT to rule out any recurring intracranial hemorrhage.  Will give a headache cocktail and reassess.  Anticipate discharge for this patient.    ED Course:   Initial assessment performed. The patients presenting problems have been discussed, and they are in agreement with the care plan formulated and outlined with them.  I have encouraged them to ask questions as they arise throughout their visit.    ED Course as of 12/12/24 0624   Thu Dec 12, 2024   0557 Reassessed patient.  Patient states that his headache is improving. [AS]   0622 Patient's states that his pain has completely improved.  Will prescribe him Fioricet.  Stable for discharge [AS]      ED Course User Index  [AS] Lita Ferguson, DO       Medications Administered         butalbital-acetaminophen-caffeine (FIORICET, ESGIC) per tablet 1 tablet Admin Date  12/12/2024 Action  Given Dose  1 tablet Rate   Route  Oral Documented By  Yessenia Arellano, RN

## 2024-12-18 ENCOUNTER — APPOINTMENT (OUTPATIENT)
Facility: HOSPITAL | Age: 74
End: 2024-12-18
Payer: MEDICARE

## 2024-12-18 ENCOUNTER — HOSPITAL ENCOUNTER (OUTPATIENT)
Facility: HOSPITAL | Age: 74
Discharge: HOME | End: 2024-12-21
Payer: MEDICARE

## 2024-12-18 DIAGNOSIS — T84.84XA PAIN OF BOTH LOWER EXTREMITIES DUE TO BILATERAL TOTAL KNEE REPLACEMENTS, INITIAL ENCOUNTER (HCC): ICD-10-CM

## 2024-12-18 DIAGNOSIS — Z96.653 PAIN OF BOTH LOWER EXTREMITIES DUE TO BILATERAL TOTAL KNEE REPLACEMENTS, INITIAL ENCOUNTER (HCC): ICD-10-CM

## 2024-12-18 PROCEDURE — 73700 CT LOWER EXTREMITY W/O DYE: CPT

## 2025-05-23 ENCOUNTER — TRANSCRIBE ORDERS (OUTPATIENT)
Facility: HOSPITAL | Age: 75
End: 2025-05-23

## 2025-05-23 DIAGNOSIS — J84.9 TROPICAL PULMONARY ALVEOLITIS (HCC): Primary | ICD-10-CM

## 2025-06-30 ENCOUNTER — HOSPITAL ENCOUNTER (OUTPATIENT)
Facility: HOSPITAL | Age: 75
Discharge: HOME | End: 2025-07-03
Payer: MEDICARE

## 2025-06-30 ENCOUNTER — HOSPITAL ENCOUNTER (OUTPATIENT)
Facility: HOSPITAL | Age: 75
Setting detail: RECURRING SERIES
Discharge: HOME | End: 2025-07-03

## 2025-06-30 VITALS
BODY MASS INDEX: 30.16 KG/M2 | WEIGHT: 222.66 LBS | DIASTOLIC BLOOD PRESSURE: 72 MMHG | OXYGEN SATURATION: 98 % | HEIGHT: 72 IN | SYSTOLIC BLOOD PRESSURE: 118 MMHG | HEART RATE: 69 BPM | TEMPERATURE: 97.9 F | RESPIRATION RATE: 14 BRPM

## 2025-06-30 LAB
ALBUMIN SERPL-MCNC: 3.6 G/DL (ref 3.5–5)
ALBUMIN/GLOB SERPL: 1.1 (ref 1.1–2.2)
ALP SERPL-CCNC: 76 U/L (ref 45–117)
ALT SERPL-CCNC: 27 U/L (ref 12–78)
ANION GAP SERPL CALC-SCNC: 6 MMOL/L (ref 2–12)
APPEARANCE UR: CLEAR
AST SERPL-CCNC: 17 U/L (ref 15–37)
BACTERIA URNS QL MICRO: NEGATIVE /HPF
BILIRUB SERPL-MCNC: 0.8 MG/DL (ref 0.2–1)
BILIRUB UR QL: NEGATIVE
BUN SERPL-MCNC: 15 MG/DL (ref 6–20)
BUN/CREAT SERPL: 17 (ref 12–20)
CALCIUM SERPL-MCNC: 9.3 MG/DL (ref 8.5–10.1)
CHLORIDE SERPL-SCNC: 107 MMOL/L (ref 97–108)
CO2 SERPL-SCNC: 26 MMOL/L (ref 21–32)
COLOR UR: NORMAL
CREAT SERPL-MCNC: 0.88 MG/DL (ref 0.7–1.3)
EPITH CASTS URNS QL MICRO: NORMAL /LPF
ERYTHROCYTE [DISTWIDTH] IN BLOOD BY AUTOMATED COUNT: 14 % (ref 11.5–14.5)
EST. AVERAGE GLUCOSE BLD GHB EST-MCNC: 126 MG/DL
GLOBULIN SER CALC-MCNC: 3.2 G/DL (ref 2–4)
GLUCOSE SERPL-MCNC: 124 MG/DL (ref 65–100)
GLUCOSE UR STRIP.AUTO-MCNC: NEGATIVE MG/DL
HBA1C MFR BLD: 6 % (ref 4–5.6)
HCT VFR BLD AUTO: 47.4 % (ref 36.6–50.3)
HGB BLD-MCNC: 15.5 G/DL (ref 12.1–17)
HGB UR QL STRIP: NEGATIVE
HYALINE CASTS URNS QL MICRO: NORMAL /LPF (ref 0–2)
INR PPP: 1 (ref 0.9–1.1)
KETONES UR QL STRIP.AUTO: NEGATIVE MG/DL
LEUKOCYTE ESTERASE UR QL STRIP.AUTO: NEGATIVE
MCH RBC QN AUTO: 29.4 PG (ref 26–34)
MCHC RBC AUTO-ENTMCNC: 32.7 G/DL (ref 30–36.5)
MCV RBC AUTO: 89.8 FL (ref 80–99)
NITRITE UR QL STRIP.AUTO: NEGATIVE
NRBC # BLD: 0 K/UL (ref 0–0.01)
NRBC BLD-RTO: 0 PER 100 WBC
PH UR STRIP: 5.5 (ref 5–8)
PLATELET # BLD AUTO: 231 K/UL (ref 150–400)
PMV BLD AUTO: 10 FL (ref 8.9–12.9)
POTASSIUM SERPL-SCNC: 3.8 MMOL/L (ref 3.5–5.1)
PROT SERPL-MCNC: 6.8 G/DL (ref 6.4–8.2)
PROT UR STRIP-MCNC: NEGATIVE MG/DL
PROTHROMBIN TIME: 10.6 SEC (ref 9.2–11.2)
RBC # BLD AUTO: 5.28 M/UL (ref 4.1–5.7)
RBC #/AREA URNS HPF: NORMAL /HPF (ref 0–5)
SODIUM SERPL-SCNC: 139 MMOL/L (ref 136–145)
SP GR UR REFRACTOMETRY: 1.01
URINE CULTURE IF INDICATED: NORMAL
UROBILINOGEN UR QL STRIP.AUTO: 0.2 EU/DL (ref 0.2–1)
WBC # BLD AUTO: 5.7 K/UL (ref 4.1–11.1)
WBC URNS QL MICRO: NORMAL /HPF (ref 0–4)

## 2025-06-30 PROCEDURE — 81001 URINALYSIS AUTO W/SCOPE: CPT

## 2025-06-30 PROCEDURE — 36415 COLL VENOUS BLD VENIPUNCTURE: CPT

## 2025-06-30 PROCEDURE — 97161 PT EVAL LOW COMPLEX 20 MIN: CPT

## 2025-06-30 PROCEDURE — 97530 THERAPEUTIC ACTIVITIES: CPT

## 2025-06-30 PROCEDURE — 85610 PROTHROMBIN TIME: CPT

## 2025-06-30 PROCEDURE — 83036 HEMOGLOBIN GLYCOSYLATED A1C: CPT

## 2025-06-30 PROCEDURE — 85027 COMPLETE CBC AUTOMATED: CPT

## 2025-06-30 PROCEDURE — 80053 COMPREHEN METABOLIC PANEL: CPT

## 2025-06-30 RX ORDER — SODIUM CHLORIDE, SODIUM LACTATE, POTASSIUM CHLORIDE, CALCIUM CHLORIDE 600; 310; 30; 20 MG/100ML; MG/100ML; MG/100ML; MG/100ML
INJECTION, SOLUTION INTRAVENOUS CONTINUOUS
OUTPATIENT
Start: 2025-07-10

## 2025-06-30 RX ORDER — ACETAMINOPHEN 500 MG
1000 TABLET ORAL ONCE
OUTPATIENT
Start: 2025-07-10

## 2025-06-30 RX ORDER — UBIDECARENONE 30 MG
CAPSULE ORAL DAILY
COMMUNITY

## 2025-06-30 RX ORDER — CELECOXIB 200 MG/1
400 CAPSULE ORAL ONCE
OUTPATIENT
Start: 2025-07-10

## 2025-06-30 RX ORDER — TRAZODONE HYDROCHLORIDE 50 MG/1
50 TABLET ORAL NIGHTLY
COMMUNITY

## 2025-06-30 RX ORDER — CEFAZOLIN SODIUM/WATER 2 G/20 ML
2000 SYRINGE (ML) INTRAVENOUS ONCE
OUTPATIENT
Start: 2025-07-10

## 2025-06-30 RX ORDER — ACETAMINOPHEN 500 MG
500 TABLET ORAL EVERY 6 HOURS PRN
COMMUNITY

## 2025-06-30 RX ORDER — SILDENAFIL 25 MG/1
25 TABLET, FILM COATED ORAL PRN
COMMUNITY

## 2025-06-30 ASSESSMENT — KOOS JR
STANDING UPRIGHT: MILD
HOW SEVERE IS YOUR KNEE STIFFNESS AFTER FIRST WAKING IN MORNING: SEVERE
BENDING TO THE FLOOR TO PICK UP OBJECT: SEVERE
GOING UP OR DOWN STAIRS: MILD
RISING FROM SITTING: SEVERE
KOOS JR TOTAL INTERVAL SCORE: 59.381

## 2025-06-30 ASSESSMENT — PROMIS GLOBAL HEALTH SCALE
SUM OF RESPONSES TO QUESTIONS 3, 6, 7, & 8: 10
IN GENERAL, WOULD YOU SAY YOUR QUALITY OF LIFE IS...[ON A SCALE OF 1 (POOR) TO 5 (EXCELLENT)]: VERY GOOD
SUM OF RESPONSES TO QUESTIONS 2, 4, 5, & 10: 13
IN GENERAL, HOW WOULD YOU RATE YOUR PHYSICAL HEALTH [ON A SCALE OF 1 (POOR) TO 5 (EXCELLENT)]?: GOOD
TO WHAT EXTENT ARE YOU ABLE TO CARRY OUT YOUR EVERYDAY PHYSICAL ACTIVITIES SUCH AS WALKING, CLIMBING STAIRS, CARRYING GROCERIES, OR MOVING A CHAIR [ON A SCALE OF 1 (NOT AT ALL) TO 5 (COMPLETELY)]?: MOSTLY
IN GENERAL, HOW WOULD YOU RATE YOUR SATISFACTION WITH YOUR SOCIAL ACTIVITIES AND RELATIONSHIPS [ON A SCALE OF 1 (POOR) TO 5 (EXCELLENT)]?: VERY GOOD
IN GENERAL, PLEASE RATE HOW WELL YOU CARRY OUT YOUR USUAL SOCIAL ACTIVITIES (INCLUDES ACTIVITIES AT HOME, AT WORK, AND IN YOUR COMMUNITY, AND RESPONSIBILITIES AS A PARENT, CHILD, SPOUSE, EMPLOYEE, FRIEND, ETC) [ON A SCALE OF 1 (POOR) TO 5 (EXCELLENT)]?: EXCELLENT
IN GENERAL, WOULD YOU SAY YOUR HEALTH IS...[ON A SCALE OF 1 (POOR) TO 5 (EXCELLENT)]: GOOD
IN THE PAST 7 DAYS, HOW OFTEN HAVE YOU BEEN BOTHERED BY EMOTIONAL PROBLEMS, SUCH AS FEELING ANXIOUS, DEPRESSED, OR IRRITABLE [ON A SCALE FROM 1 (NEVER) TO 5 (ALWAYS)]?: OFTEN
IN THE PAST 7 DAYS, HOW WOULD YOU RATE YOUR PAIN ON AVERAGE [ON A SCALE FROM 0 (NO PAIN) TO 10 (WORST IMAGINABLE PAIN)]?: 0 NO PAIN
WHO IS THE PERSON COMPLETING THE PROMIS V1.1 SURVEY?: SELF
IN GENERAL, HOW WOULD YOU RATE YOUR MENTAL HEALTH, INCLUDING YOUR MOOD AND YOUR ABILITY TO THINK [ON A SCALE OF 1 (POOR) TO 5 (EXCELLENT)]?: GOOD
HOW IS THE PROMIS V1.1 BEING ADMINISTERED?: PAPER
IN THE PAST 7 DAYS, HOW WOULD YOU RATE YOUR FATIGUE ON AVERAGE [ON A SCALE FROM 1 (NONE) TO 5 (VERY SEVERE)]?: MODERATE

## 2025-06-30 NOTE — PERIOP NOTE
PAT Nurse Practitioner   Pre-Operative Chart Review/Assessment            Patient Name:  Ajit Gutiérrez          Age:   74 y.o.    :  1950    Surgeon:   Kyrie     Primary Care Provider:    Albert Juarez MD    Date of PAT:  25     Date of Surgery:    7/10/2025     Procedure(s):  RIGHT KNEE REVISION     Anesthesia:  Choice     Pertinent Medical History:           Myasthenia gravis - Dr. Garcia         Sleep apnea         Hypertension        Asthma        Anxiety         CAD by coronary CT       Allergies:  No Known Allergies           Chart Review and Plan:     Pertinent abnormal Lab findings:  No acute abnormalities       1)  Cardiac Review :  denies active cardiac symptoms, acceptable functional status.   CAD by coronary scan, Michell has ordered stress test prior to procedure --> Normal stress test and deemed low cardiac risk to proceed (Michell 25)     EKG (25):  Normal sinus rhythm   Minimal voltage criteria for LVH, may be normal variant ( R in aVL )   Confirmed by Roby Torres M.D. ([id number]) on 12/10/2024 9:10:33 AM        2)  Sleep apnea screening:  STOP BAN - known mild VIRGILIO       3)  Program for Diabetes Health Consult:  Not indicated, A1c 6.0       5) Discharge plan: independent ambulating in community, ,one recent fall; lives with wife.  Active and performing knee exercises.  Diff with stairs, h/o bilateral TKA about 15 years ago. Lives in 2 level home, with spouse.  Straight cane and RW prior to admission       6) Medication recommendations prior to surgery:  Special Instructions:      Please hold your Cellcept for 1 week prior to surgery (last dose ). Please restart your Cellcept 2 weeks after surgery per your instructions ().   Please hold vitamins/supplements, meloxicam, and other NSAID's such as ibuprofen, Advil, Aleve, Motrin, etc for 5 days prior to surgery (last dose ). You may continue to take tylenol.

## 2025-06-30 NOTE — PROGRESS NOTES
Patient attended the Joint Replacement Education Class at [facility]. The content of the class was presented using a power point presentation specific for patients undergoing hip and knee replacement surgery. The [facility] Joint Replacement Education Handbook was given to the patient.  Preparing for surgery, day of surgery routine and expectations, discharge process and help at home expectations, nutrition,medications, infection control, pain management, DVT prevention, ice therapy and safety were reviewed in class. Opportunity for questions provided, patient verbalized understanding of instructions.    Patient reports has RW for after surgery, will bring to hospital on DOS 7/10/2025.  Patient reports  is Janine-spouse and plans to discharge to home.    PROMis/KOOS completed 6/30/2025.  KNEE DISABILITY OSTEOARTHRITIS AND OUTCOME SCORE    Stiffness - The following question concerns the amount of joing stiffness you have experienced during the last week in your knee. Stiffness is a sensation of restriction or slowness in the ease with which you move your knee joint.  How severe is your knee stiffness after first wakening in the morning?: 3        Pain - What amount of knee pain have you experienced the last week during the following activities?  Twisting/pivoting on your knee: 0  Straightening knee fully: 0  Going up or down stairs: 1  Standing upright: 1        Function - Please indicate the degree of difficulty you have experienced in the last week due to your knee.  Rising from sitting: 3  Bending to floor/ an object: 3        Raw Score  Jr TERRANCE. Knee Survey Score: 11  KOOS JR Total Interval Score (0-100 Scale): 59.381      PROMIS Questions    In general, would you say your health is:: 3    In general, would you say your quality of life is:: 4    In general, how would you rate your physical health?: 3    In general, how would you rate your mental health,

## 2025-06-30 NOTE — PERIOP NOTE

## 2025-06-30 NOTE — PROGRESS NOTES
[facility]  Physical Therapy Pre-surgery evaluation  [address] [place] [address]    PHYSICAL THERAPY PRE TKR SURGERY EVALUATION    Date: 2025  Patient: Ajit Gutiérrez (74 y.o. male)  : 1950  Medical Diagnosis: No admission diagnoses are documented for this encounter.  Procedure(s) (LRB):  RIGHT KNEE REVISION (Right)     Treatment Diagnosis: M25.561  RIGHT KNEE PAIN    Referral Source: Nato Mittal MD  Provider #: Nato Mittal   NPI #: [id number]   Precautions:        ASSESSMENT :  Based on the objective data described below, the patient presents with impaired gait, balance, pain, and overall high level functional mobility due to end stage degenerative joint disease in the right knee.     Discussed anticipated disposition to home with possible discharge within a 0 to 2 day time frame post-surgery. Patient's  was present for the session. Patient and  in agreement. Patient has been educated on the recommendation for reliable help following surgery and has arranged for at least 24 hours of care after discharge.        The patient indicated he is possibly  interested to discharge day of surgery if all discharge criteria met, but also would feel comfortable staying over.   Patient voiced good  understanding of therapy specific criteria to discharge day of surgery. Patient with good potential for discharging same day of surgery based on PMH, social support, and current condition.      Fast Track pathway: [] YES [x] NO   Patient agrees to arrange at least 24 hours of care following surgery: [x] YES [] NO   Patient has outpatient PT appointments scheduled:  [] YES [x] NO will have HHPT first       GOALS: (Goals have been discussed and agreed upon with patient.)  DISCHARGE GOALS: Time Frame: 1 DAY  Patient will demonstrate increased strength, range of motion, and pain control via a home exercise program in order to minimize functional deficits in

## 2025-06-30 NOTE — PERIOP NOTE

## 2025-06-30 NOTE — PERIOP NOTE
Pt states he was told to hold Cellcept for 1 week prior to surgery and two weeks after surgery by Dr. Mittal. Called pt's neurologist, Dr. Garcia at [facility] and asked if the above aric-op instructions for the Cellcept were acceptable. Dr. Nelson's office called back and stated pt is ok to hold Cellcept per the instructions from his surgeon.

## 2025-06-30 NOTE — PERIOP NOTE
[facility]  Joint/Spine Preoperative Instructions        Surgery Date: 7/10/25          Time of Arrival To Be Called (day prior between 2-5pm)  Contact: [phone]       1. On the day of your surgery, please report to the Surgical Services Registration Desk and sign in at your designated time. The Surgery Center is located to the right of the Emergency Room.     2. You must have someone with you to drive you home. You should not drive a car for 24 hours following surgery. Please make arrangements for a friend or family member to stay with you for the first 24 hours after your surgery.    3. No food after 11:59 PM Wednesday July 9th.  Medications morning of surgery should be taken with a sip of water.  Please follow pre-surgery drink instructions that were given at your Pre Admission Testing appointment.      4. We recommend you do not drink any alcoholic beverages for 24 hours before and after your surgery.    5. Contact your surgeon’s office for instructions on the following medications: non-steroidal anti-inflammatory drugs (i.e. Advil, Aleve), vitamins, and supplements. (Some surgeon’s will want you to stop these medications prior to surgery and others may allow you to take them)  **If you are currently taking Plavix, Coumadin, Aspirin and/or other blood-thinning agents, contact your surgeon for instructions.** Your surgeon will partner with the physician prescribing these medications to determine if it is safe to stop or if you need to continue taking.  Please do not stop taking these medications without instructions from your surgeon    6. Wear comfortable clothes.  Wear glasses instead of contacts.  Do not bring any money or jewelry. Please bring picture ID, insurance card, and any prearranged co-payment or hospital payment.  Do not wear make-up, particularly mascara the morning of your surgery.  Do not wear nail polish, particularly if you are having foot /hand surgery.

## 2025-07-02 NOTE — DISCHARGE INSTRUCTIONS
Discharge Instructions:  Ajit Gutiérrez    Surgery: Right Knee Revision.        To relieve pain:  Use ice/gel packs.    -Put the ice pack directly over the wound, or anywhere you are hurting or swollen.   -To control pain and swelling, keep ice on regularly, especially after physical activity.  -The packs should stay cold for 3-4 hours.  When it is not cold anymore, rotate with the packs in the freezer.      Elevate your leg.  This will also keep swelling down.    Rest for at least 20 minutes between activity or exercises.    To keep track of your pain medications, write down what you take and when you take it.    The last dose of pain medication you got in the hospital was:     Medication    Dose    Date & Time          To keep your pain under control, you can take Tylenol every 6 hours while you are awake  for the first week.     Take your as needed pain medications  as prescribed on the bottle.     To prevent nausea, take your pain medications with food.          As your pain lessens:    Slowly start taking less pain medication. You may do this by waiting longer between doses or by taking smaller doses.    Stop using the pain medications as soon as you no longer need it, usually in 1-2 weeks         Aspirin  To prevent blood clots, you will need to take Aspirin 81 mg twice a day for 30 days.              To prevent stomach upset or bleeding:  Take Pepcid 20 mg twice a day, or a similar home medication, while you are taking a blood thinner.         Keep your waterproof dressing in place. It will be removed by your surgeon during your follow-up appointment in 2 weeks.     You may need to change the dressing if you are having drainage to where the dressing is no longer intact. You will be given an extra dressing to use at home.    You will have some swelling, warmth, and bruising around the incision and up and down the leg after surgery.  This will may get worse in the first few days you are home and will  slowly get better over the next few weeks.     You may shower with this dressing over your wound. After showering pat the dressing dry.       DO NOT's:  Do not rub your surgical wound  Do not put lotion or oils on your wound.   Do not take a tub bath or go swimming until your doctor says it is ok.          To increase and promote healing:  Stop Smoking (or at least cut back on smoking).            Eat a well-balanced diet. High in protein and Vitamin C.     If you have a poor appetite, supplement your diet by drinking Ensure, Glucerna or [place] Instant Breakfast for the next 30 days     If you are a diabetic, control you blood sugars to prevent infection and help your wound heal.                     Prevent Infection:    Wash your hands                       -This is the most important thing you or your caregiver can do.  -Wash your hands with soap and water (or use the hand ) before you touch any wounds.                 Shower  -Use the antibacterial soap we gave you when you take a shower.   -Shower with this soap until your wounds are healed.                     Use Clean Sheets   -Put freshly cleaned sheets on your bed after surgery.   -To keep the surgery site clean, do not allow pets to sleep with you while your wound is still healing.         To prevent constipation, stay active and drink plenty of fluid.    While using pain medications, you should also take stool softeners and laxatives, such as Pericolace and Miralax.       If you are having too many bowel movements, then you may need to stop taking the laxatives.    You should have a bowel movement 3-4 days after surgery and then at least every other day while on pain medication.          To improve your recovery, you must be active!    Use your walker and take short walks (in your home) about every 2 hours during the day.    Try to increase how far you walk each day.     You can put as much weight on your leg as you can tolerate while walking.

## 2025-07-08 NOTE — H&P
Nato Mittal MD - Adult Reconstruction and Total Joint Replacement     Orthopaedic History and Physical        NAME: Ajit Gutiérrez       :  1950       MRN:  [id number]          [place] - 2025 9:30 AM EST  Formatting of this note is different from the original.    This note has been transcribed electronically and is believed to be accurate but may contain errors due to technological limitations.  Subjective:  Patient ID: Ajit Gutiérrez is a 74 y.o. male.  Chief Complaint: Follow-up of the Left Knee and Follow-up of the Right Knee    Mr. Gutiérrez presents for follow up for his bilateral knee pain. He still doesn't have much pain on the right knee but still has reduced range of motion and swelling. He feels an occasional knot sensation along the IT band laterally along the right upper leg. He is here to review a recent bilateral knee CT scan. He has myasthenia gravis and takes CellCept.    There are no active problems to display for this patient.    Past Medical History:   Diagnosis Date    Adverse effect of anesthesia     have to be particular with type of anesthesia d/t myasthenia gravis    Anxiety     Asthma     Elevated cholesterol     History of gastric ulcer     resolved    History of kidney stones     Hypertension     Ileus, postoperative (HCC)     Ill-defined condition     elevated cholesterol    Myasthenia gravis (HCC)     Pleural plaque due to asbestos exposure     Seasonal allergic rhinitis     Sleep apnea     mild, no cpap      Past Surgical History:   Procedure Laterality Date    COLONOSCOPY      COLONOSCOPY N/A 2022    COLONOSCOPY performed by Eligio Asencio Jr., MD at [facility] ENDOSCOPY    COLONOSCOPY N/A 2019    COLONOSCOPY performed by Eligio Asencio Jr., MD at [facility] ENDOSCOPY    COLONOSCOPY N/A 2018    COLONOSCOPY performed by Eligio Asencio Jr., MD at [facility] ENDOSCOPY    COLONOSCOPY,BIOPSY  2019          COLONOSCOPY,BIOPSY  03/07/2018         COLONOSCOPY,REMHUANG BARNARD,SNARE  04/03/2019         EDG US EXAM SURGICAL ALTER STOM DUODENUM/JEJUNUM  12/20/2016         ENDOSCOPIC ULTRASOUND EXAM  02/07/2018         HEENT      nasal polyps    HERNIA REPAIR      inguinal ? cant remember side    ORTHOPEDIC SURGERY Right     CTR    ORTHOPEDIC SURGERY Left     toe joint/synovial cyst removed    ORTHOPEDIC SURGERY Bilateral     kiera knee replacement    ORTHOPEDIC SURGERY Right     rotator cuff repair    TONSILLECTOMY        Prior to Admission medications    Medication Sig Start Date End Date Taking? Authorizing Provider   traZODone (DESYREL) 50 MG tablet Take 1 tablet by mouth nightly    Provider, MD Raisa   Multiple Vitamins-Minerals (MULTI FOR HIM 50+) TABS Take by mouth daily    ProviderRaisa MD   Cholecalciferol (VITAMIN D-3 PO) Take by mouth daily    ProviderRaisa MD   acetaminophen (TYLENOL) 500 MG tablet Take 1 tablet by mouth every 6 hours as needed for Pain    ProviderRaisa MD   sildenafil (VIAGRA) 25 MG tablet Take 1 tablet by mouth as needed for Erectile Dysfunction    ProviderRaisa MD   butalbital-APAP-caffeine -40 MG CAPS per capsule Take 1 capsule by mouth every 4 hours as needed for Headaches  Patient not taking: Reported on 6/30/2025 12/12/24   Lita Ferguson,    albuterol sulfate HFA (PROVENTIL;VENTOLIN;PROAIR) 108 (90 Base) MCG/ACT inhaler Inhale 2 puffs into the lungs every 4 hours as needed    Automatic Reconciliation, Ar   atorvastatin (LIPITOR) 10 MG tablet Take 1 tablet by mouth daily    Automatic Reconciliation, Ar   Azelastine-Fluticasone 137-50 MCG/ACT SUSP by Nasal route as needed    Automatic Reconciliation, Ar   budesonide-formoterol (SYMBICORT) 160-4.5 MCG/ACT AERO Inhale 2 puffs into the lungs 2 times daily as needed    Automatic Reconciliation, Ar   Cetirizine HCl 10 MG CAPS Take by mouth daily    Automatic Reconciliation, Ar   hydroCHLOROthiazide

## 2025-07-10 ENCOUNTER — ANESTHESIA EVENT (OUTPATIENT)
Facility: HOSPITAL | Age: 75
DRG: 560 | End: 2025-07-10
Payer: MEDICARE

## 2025-07-10 ENCOUNTER — HOSPITAL ENCOUNTER (INPATIENT)
Facility: HOSPITAL | Age: 75
LOS: 1 days | Discharge: HOME HEALTH | DRG: 467 | End: 2025-07-11
Attending: ORTHOPAEDIC SURGERY | Admitting: ORTHOPAEDIC SURGERY
Payer: MEDICARE

## 2025-07-10 ENCOUNTER — ANESTHESIA (OUTPATIENT)
Facility: HOSPITAL | Age: 75
DRG: 560 | End: 2025-07-10
Payer: MEDICARE

## 2025-07-10 DIAGNOSIS — T84.032A LOOSENING OF PROSTHESIS OF RIGHT TOTAL KNEE REPLACEMENT, INITIAL ENCOUNTER: Primary | ICD-10-CM

## 2025-07-10 PROCEDURE — 2580000003 HC RX 258

## 2025-07-10 PROCEDURE — 7100000000 HC PACU RECOVERY - FIRST 15 MIN: Performed by: ORTHOPAEDIC SURGERY

## 2025-07-10 PROCEDURE — 2580000003 HC RX 258: Performed by: ANESTHESIOLOGY

## 2025-07-10 PROCEDURE — C1713 ANCHOR/SCREW BN/BN,TIS/BN: HCPCS | Performed by: ORTHOPAEDIC SURGERY

## 2025-07-10 PROCEDURE — 97162 PT EVAL MOD COMPLEX 30 MIN: CPT

## 2025-07-10 PROCEDURE — APPNB180 APP NON BILLABLE TIME > 60 MINS: Performed by: PHYSICIAN ASSISTANT

## 2025-07-10 PROCEDURE — 6360000002 HC RX W HCPCS: Performed by: STUDENT IN AN ORGANIZED HEALTH CARE EDUCATION/TRAINING PROGRAM

## 2025-07-10 PROCEDURE — 6360000002 HC RX W HCPCS: Performed by: PHYSICIAN ASSISTANT

## 2025-07-10 PROCEDURE — 0SPC0JZ REMOVAL OF SYNTHETIC SUBSTITUTE FROM RIGHT KNEE JOINT, OPEN APPROACH: ICD-10-PCS | Performed by: ORTHOPAEDIC SURGERY

## 2025-07-10 PROCEDURE — 2500000003 HC RX 250 WO HCPCS: Performed by: PHYSICIAN ASSISTANT

## 2025-07-10 PROCEDURE — 0SRC0J9 REPLACEMENT OF RIGHT KNEE JOINT WITH SYNTHETIC SUBSTITUTE, CEMENTED, OPEN APPROACH: ICD-10-PCS | Performed by: ORTHOPAEDIC SURGERY

## 2025-07-10 PROCEDURE — 6360000002 HC RX W HCPCS

## 2025-07-10 PROCEDURE — C1776 JOINT DEVICE (IMPLANTABLE): HCPCS | Performed by: ORTHOPAEDIC SURGERY

## 2025-07-10 PROCEDURE — 3700000001 HC ADD 15 MINUTES (ANESTHESIA): Performed by: ORTHOPAEDIC SURGERY

## 2025-07-10 PROCEDURE — 1100000000 HC RM PRIVATE

## 2025-07-10 PROCEDURE — 3600000004 HC SURGERY LEVEL 4 BASE: Performed by: ORTHOPAEDIC SURGERY

## 2025-07-10 PROCEDURE — 2709999900 HC NON-CHARGEABLE SUPPLY: Performed by: ORTHOPAEDIC SURGERY

## 2025-07-10 PROCEDURE — 3700000000 HC ANESTHESIA ATTENDED CARE: Performed by: ORTHOPAEDIC SURGERY

## 2025-07-10 PROCEDURE — 6360000002 HC RX W HCPCS: Performed by: ORTHOPAEDIC SURGERY

## 2025-07-10 PROCEDURE — 7100000001 HC PACU RECOVERY - ADDTL 15 MIN: Performed by: ORTHOPAEDIC SURGERY

## 2025-07-10 PROCEDURE — 2500000003 HC RX 250 WO HCPCS

## 2025-07-10 PROCEDURE — 64447 NJX AA&/STRD FEMORAL NRV IMG: CPT | Performed by: STUDENT IN AN ORGANIZED HEALTH CARE EDUCATION/TRAINING PROGRAM

## 2025-07-10 PROCEDURE — 97116 GAIT TRAINING THERAPY: CPT

## 2025-07-10 PROCEDURE — 6370000000 HC RX 637 (ALT 250 FOR IP): Performed by: PHYSICIAN ASSISTANT

## 2025-07-10 PROCEDURE — 6370000000 HC RX 637 (ALT 250 FOR IP): Performed by: ORTHOPAEDIC SURGERY

## 2025-07-10 PROCEDURE — 3600000014 HC SURGERY LEVEL 4 ADDTL 15MIN: Performed by: ORTHOPAEDIC SURGERY

## 2025-07-10 DEVICE — IMPLANTABLE DEVICE: Type: IMPLANTABLE DEVICE | Site: KNEE | Status: FUNCTIONAL

## 2025-07-10 DEVICE — ATTUNE KNEE SYSTEM REVISION TIBIAL SLEEVE POROCOAT FULLY COATED 37MM
Type: IMPLANTABLE DEVICE | Site: KNEE | Status: FUNCTIONAL
Brand: ATTUNE

## 2025-07-10 DEVICE — STEM FEM L110MM DIA16MM KNEE TI ALLY CRSS SLT PRESSFIT REV: Type: IMPLANTABLE DEVICE | Site: KNEE | Status: FUNCTIONAL

## 2025-07-10 DEVICE — PALACOS® R+G IS A FAST SETTING POLYMER CONTAINING GENTAMICIN, FOR USE IN BONE SURGERY. MIXING OF THE TWO COMPONENT SYSTEM, CONSISTING OF A POWDER AND A LIQUID, INITIALLY PRODUCES A LIQUID AND THEN A PASTE, WHICH IS USED TO ANCHOR THE PROSTHESIS TO THE BONE. THE HARDENED BONE CEMENT ALLOWS STABLE FIXATION OF THE PROSTHESIS AND TRANSFERS ALL STRESSES PRODUCED IN A MOVEMENT TO THE BONE VIA THE LARGE INTERFACE. INSOLUBLE ZIRCONIUM DIOXIDE IS INCLUDED IN THE CEMENT POWDER AS AN X RAY CONTRAST MEDIUM. THE CHLOROPHYLL ADDITIVE SERVES AS OPTICAL MARKING OF THE BONE CEMENT AT THE SITE OF THE OPERATION.
Type: IMPLANTABLE DEVICE | Site: KNEE | Status: FUNCTIONAL
Brand: PALACOS®

## 2025-07-10 DEVICE — ATTUNE KNEE SYSTEM REVISION POSTERIOR FEMORAL AUGMENT 8MM CEMENTED SIZE 8
Type: IMPLANTABLE DEVICE | Site: KNEE | Status: FUNCTIONAL
Brand: ATTUNE

## 2025-07-10 DEVICE — ATTUNE KNEE SYSTEM REVISION FEMORAL SLEEVE POROCOAT FULLY COATED 50MM
Type: IMPLANTABLE DEVICE | Site: KNEE | Status: FUNCTIONAL
Brand: ATTUNE

## 2025-07-10 DEVICE — ATTUNE KNEE SYSTEM REVISION PRESSFIT STEM 20X110MM
Type: IMPLANTABLE DEVICE | Site: KNEE | Status: FUNCTIONAL
Brand: ATTUNE

## 2025-07-10 DEVICE — ATTUNE KNEE SYSTEM REVISION CRS FEMORAL CEMENTED RIGHT SIZE 8
Type: IMPLANTABLE DEVICE | Site: KNEE | Status: FUNCTIONAL
Brand: ATTUNE

## 2025-07-10 DEVICE — ATTUNE KNEE SYSTEM REVISION ROTATING PLATFORM TIBIAL BASE CEMENTED SIZE 6
Type: IMPLANTABLE DEVICE | Site: KNEE | Status: FUNCTIONAL
Brand: ATTUNE

## 2025-07-10 RX ORDER — ONDANSETRON 2 MG/ML
INJECTION INTRAMUSCULAR; INTRAVENOUS
Status: DISCONTINUED | OUTPATIENT
Start: 2025-07-10 | End: 2025-07-10 | Stop reason: SDUPTHER

## 2025-07-10 RX ORDER — TRAZODONE HYDROCHLORIDE 50 MG/1
50 TABLET ORAL NIGHTLY
Status: DISCONTINUED | OUTPATIENT
Start: 2025-07-10 | End: 2025-07-11 | Stop reason: HOSPADM

## 2025-07-10 RX ORDER — TRAMADOL HYDROCHLORIDE 50 MG/1
100 TABLET ORAL EVERY 6 HOURS PRN
Status: DISCONTINUED | OUTPATIENT
Start: 2025-07-10 | End: 2025-07-11 | Stop reason: HOSPADM

## 2025-07-10 RX ORDER — DEXAMETHASONE SODIUM PHOSPHATE 4 MG/ML
INJECTION, SOLUTION INTRA-ARTICULAR; INTRALESIONAL; INTRAMUSCULAR; INTRAVENOUS; SOFT TISSUE
Status: DISCONTINUED | OUTPATIENT
Start: 2025-07-10 | End: 2025-07-10 | Stop reason: SDUPTHER

## 2025-07-10 RX ORDER — BUPIVACAINE HYDROCHLORIDE 5 MG/ML
INJECTION, SOLUTION EPIDURAL; INTRACAUDAL PRN
Status: DISCONTINUED | OUTPATIENT
Start: 2025-07-10 | End: 2025-07-10 | Stop reason: ALTCHOICE

## 2025-07-10 RX ORDER — DIPHENHYDRAMINE HCL 25 MG
25 CAPSULE ORAL EVERY 6 HOURS PRN
Status: DISCONTINUED | OUTPATIENT
Start: 2025-07-10 | End: 2025-07-11 | Stop reason: HOSPADM

## 2025-07-10 RX ORDER — ONDANSETRON 2 MG/ML
4 INJECTION INTRAMUSCULAR; INTRAVENOUS
Status: DISCONTINUED | OUTPATIENT
Start: 2025-07-10 | End: 2025-07-10 | Stop reason: HOSPADM

## 2025-07-10 RX ORDER — HYDROMORPHONE HYDROCHLORIDE 1 MG/ML
0.5 INJECTION, SOLUTION INTRAMUSCULAR; INTRAVENOUS; SUBCUTANEOUS EVERY 5 MIN PRN
Status: COMPLETED | OUTPATIENT
Start: 2025-07-10 | End: 2025-07-10

## 2025-07-10 RX ORDER — FAMOTIDINE 20 MG/1
20 TABLET, FILM COATED ORAL 2 TIMES DAILY
Status: DISCONTINUED | OUTPATIENT
Start: 2025-07-10 | End: 2025-07-11 | Stop reason: HOSPADM

## 2025-07-10 RX ORDER — SODIUM CHLORIDE 9 MG/ML
INJECTION, SOLUTION INTRAVENOUS CONTINUOUS
Status: DISCONTINUED | OUTPATIENT
Start: 2025-07-10 | End: 2025-07-11 | Stop reason: HOSPADM

## 2025-07-10 RX ORDER — CELECOXIB 200 MG/1
400 CAPSULE ORAL ONCE
Status: COMPLETED | OUTPATIENT
Start: 2025-07-10 | End: 2025-07-10

## 2025-07-10 RX ORDER — SODIUM CHLORIDE 0.9 % (FLUSH) 0.9 %
5-40 SYRINGE (ML) INJECTION EVERY 12 HOURS SCHEDULED
Status: DISCONTINUED | OUTPATIENT
Start: 2025-07-10 | End: 2025-07-10 | Stop reason: HOSPADM

## 2025-07-10 RX ORDER — CYCLOBENZAPRINE HCL 10 MG
10 TABLET ORAL EVERY 12 HOURS PRN
Status: DISCONTINUED | OUTPATIENT
Start: 2025-07-10 | End: 2025-07-11 | Stop reason: HOSPADM

## 2025-07-10 RX ORDER — ATORVASTATIN CALCIUM 10 MG/1
10 TABLET, FILM COATED ORAL DAILY
Status: DISCONTINUED | OUTPATIENT
Start: 2025-07-11 | End: 2025-07-11 | Stop reason: HOSPADM

## 2025-07-10 RX ORDER — OXYCODONE HYDROCHLORIDE 5 MG/1
5 TABLET ORAL
Status: DISCONTINUED | OUTPATIENT
Start: 2025-07-10 | End: 2025-07-10 | Stop reason: HOSPADM

## 2025-07-10 RX ORDER — BUPIVACAINE HYDROCHLORIDE 5 MG/ML
INJECTION, SOLUTION EPIDURAL; INTRACAUDAL; PERINEURAL
Status: COMPLETED | OUTPATIENT
Start: 2025-07-10 | End: 2025-07-10

## 2025-07-10 RX ORDER — PROPOFOL 10 MG/ML
INJECTION, EMULSION INTRAVENOUS
Status: DISCONTINUED | OUTPATIENT
Start: 2025-07-10 | End: 2025-07-10 | Stop reason: SDUPTHER

## 2025-07-10 RX ORDER — SODIUM CHLORIDE 0.9 % (FLUSH) 0.9 %
5-40 SYRINGE (ML) INJECTION PRN
Status: DISCONTINUED | OUTPATIENT
Start: 2025-07-10 | End: 2025-07-10 | Stop reason: HOSPADM

## 2025-07-10 RX ORDER — 0.9 % SODIUM CHLORIDE 0.9 %
500 INTRAVENOUS SOLUTION INTRAVENOUS ONCE AS NEEDED
Status: DISCONTINUED | OUTPATIENT
Start: 2025-07-10 | End: 2025-07-11 | Stop reason: HOSPADM

## 2025-07-10 RX ORDER — DIPHENHYDRAMINE HYDROCHLORIDE 50 MG/ML
25 INJECTION, SOLUTION INTRAMUSCULAR; INTRAVENOUS EVERY 6 HOURS PRN
Status: DISCONTINUED | OUTPATIENT
Start: 2025-07-10 | End: 2025-07-11 | Stop reason: HOSPADM

## 2025-07-10 RX ORDER — ACETAMINOPHEN 650 MG
TABLET, EXTENDED RELEASE ORAL PRN
Status: DISCONTINUED | OUTPATIENT
Start: 2025-07-10 | End: 2025-07-10 | Stop reason: ALTCHOICE

## 2025-07-10 RX ORDER — ONDANSETRON 4 MG/1
4 TABLET, ORALLY DISINTEGRATING ORAL EVERY 8 HOURS PRN
Status: DISCONTINUED | OUTPATIENT
Start: 2025-07-10 | End: 2025-07-11 | Stop reason: HOSPADM

## 2025-07-10 RX ORDER — POLYETHYLENE GLYCOL 3350 17 G/17G
17 POWDER, FOR SOLUTION ORAL DAILY
Status: DISCONTINUED | OUTPATIENT
Start: 2025-07-10 | End: 2025-07-11 | Stop reason: HOSPADM

## 2025-07-10 RX ORDER — SENNA AND DOCUSATE SODIUM 50; 8.6 MG/1; MG/1
1 TABLET, FILM COATED ORAL 2 TIMES DAILY
Status: DISCONTINUED | OUTPATIENT
Start: 2025-07-10 | End: 2025-07-11 | Stop reason: HOSPADM

## 2025-07-10 RX ORDER — FENTANYL CITRATE 50 UG/ML
25 INJECTION, SOLUTION INTRAMUSCULAR; INTRAVENOUS EVERY 5 MIN PRN
Status: COMPLETED | OUTPATIENT
Start: 2025-07-10 | End: 2025-07-10

## 2025-07-10 RX ORDER — EPHEDRINE SULFATE/0.9% NACL/PF 50 MG/5 ML
SYRINGE (ML) INTRAVENOUS
Status: DISCONTINUED | OUTPATIENT
Start: 2025-07-10 | End: 2025-07-10 | Stop reason: SDUPTHER

## 2025-07-10 RX ORDER — SODIUM CHLORIDE, SODIUM LACTATE, POTASSIUM CHLORIDE, CALCIUM CHLORIDE 600; 310; 30; 20 MG/100ML; MG/100ML; MG/100ML; MG/100ML
INJECTION, SOLUTION INTRAVENOUS CONTINUOUS
Status: DISCONTINUED | OUTPATIENT
Start: 2025-07-10 | End: 2025-07-10 | Stop reason: HOSPADM

## 2025-07-10 RX ORDER — TRANEXAMIC ACID 100 MG/ML
INJECTION, SOLUTION INTRAVENOUS
Status: DISCONTINUED | OUTPATIENT
Start: 2025-07-10 | End: 2025-07-10 | Stop reason: SDUPTHER

## 2025-07-10 RX ORDER — ACETAMINOPHEN 500 MG
1000 TABLET ORAL ONCE
Status: COMPLETED | OUTPATIENT
Start: 2025-07-10 | End: 2025-07-10

## 2025-07-10 RX ORDER — ACETAMINOPHEN 325 MG/1
650 TABLET ORAL EVERY 6 HOURS
Status: DISCONTINUED | OUTPATIENT
Start: 2025-07-10 | End: 2025-07-11 | Stop reason: HOSPADM

## 2025-07-10 RX ORDER — ASPIRIN 81 MG/1
81 TABLET ORAL 2 TIMES DAILY
Status: DISCONTINUED | OUTPATIENT
Start: 2025-07-10 | End: 2025-07-11 | Stop reason: HOSPADM

## 2025-07-10 RX ORDER — KETOROLAC TROMETHAMINE 30 MG/ML
15 INJECTION, SOLUTION INTRAMUSCULAR; INTRAVENOUS EVERY 6 HOURS
Status: COMPLETED | OUTPATIENT
Start: 2025-07-10 | End: 2025-07-11

## 2025-07-10 RX ORDER — BISACODYL 10 MG
10 SUPPOSITORY, RECTAL RECTAL DAILY PRN
Status: DISCONTINUED | OUTPATIENT
Start: 2025-07-10 | End: 2025-07-11 | Stop reason: HOSPADM

## 2025-07-10 RX ORDER — ONDANSETRON 2 MG/ML
4 INJECTION INTRAMUSCULAR; INTRAVENOUS EVERY 6 HOURS PRN
Status: DISCONTINUED | OUTPATIENT
Start: 2025-07-10 | End: 2025-07-11 | Stop reason: HOSPADM

## 2025-07-10 RX ORDER — MORPHINE SULFATE 2 MG/ML
2 INJECTION, SOLUTION INTRAMUSCULAR; INTRAVENOUS
Status: DISCONTINUED | OUTPATIENT
Start: 2025-07-10 | End: 2025-07-11 | Stop reason: HOSPADM

## 2025-07-10 RX ORDER — TRAMADOL HYDROCHLORIDE 50 MG/1
50 TABLET ORAL EVERY 6 HOURS PRN
Status: DISCONTINUED | OUTPATIENT
Start: 2025-07-10 | End: 2025-07-11 | Stop reason: HOSPADM

## 2025-07-10 RX ORDER — SODIUM CHLORIDE 0.9 % (FLUSH) 0.9 %
5-40 SYRINGE (ML) INJECTION PRN
Status: DISCONTINUED | OUTPATIENT
Start: 2025-07-10 | End: 2025-07-11 | Stop reason: HOSPADM

## 2025-07-10 RX ORDER — PHENYLEPHRINE HCL IN 0.9% NACL 0.4MG/10ML
SYRINGE (ML) INTRAVENOUS
Status: DISCONTINUED | OUTPATIENT
Start: 2025-07-10 | End: 2025-07-10 | Stop reason: SDUPTHER

## 2025-07-10 RX ORDER — FENTANYL CITRATE 50 UG/ML
INJECTION, SOLUTION INTRAMUSCULAR; INTRAVENOUS
Status: DISCONTINUED | OUTPATIENT
Start: 2025-07-10 | End: 2025-07-10 | Stop reason: SDUPTHER

## 2025-07-10 RX ORDER — SODIUM CHLORIDE 0.9 % (FLUSH) 0.9 %
5-40 SYRINGE (ML) INJECTION EVERY 12 HOURS SCHEDULED
Status: DISCONTINUED | OUTPATIENT
Start: 2025-07-10 | End: 2025-07-11 | Stop reason: HOSPADM

## 2025-07-10 RX ADMIN — FENTANYL CITRATE 25 MCG: 50 INJECTION INTRAMUSCULAR; INTRAVENOUS at 14:43

## 2025-07-10 RX ADMIN — HYDROMORPHONE HYDROCHLORIDE 0.2 MG: 1 INJECTION, SOLUTION INTRAMUSCULAR; INTRAVENOUS; SUBCUTANEOUS at 13:51

## 2025-07-10 RX ADMIN — CELECOXIB 400 MG: 200 CAPSULE ORAL at 10:25

## 2025-07-10 RX ADMIN — ONDANSETRON 4 MG: 2 INJECTION, SOLUTION INTRAMUSCULAR; INTRAVENOUS at 12:35

## 2025-07-10 RX ADMIN — PHENYLEPHRINE HYDROCHLORIDE 80 MCG: 10 INJECTION INTRAVENOUS at 12:30

## 2025-07-10 RX ADMIN — KETOROLAC TROMETHAMINE 15 MG: 30 INJECTION, SOLUTION INTRAMUSCULAR at 23:19

## 2025-07-10 RX ADMIN — HYDROMORPHONE HYDROCHLORIDE 0.5 MG: 1 INJECTION, SOLUTION INTRAMUSCULAR; INTRAVENOUS; SUBCUTANEOUS at 14:55

## 2025-07-10 RX ADMIN — FENTANYL CITRATE 25 MCG: 50 INJECTION INTRAMUSCULAR; INTRAVENOUS at 14:25

## 2025-07-10 RX ADMIN — KETOROLAC TROMETHAMINE 15 MG: 30 INJECTION, SOLUTION INTRAMUSCULAR at 16:55

## 2025-07-10 RX ADMIN — PROPOFOL 40 MG: 10 INJECTION, EMULSION INTRAVENOUS at 11:48

## 2025-07-10 RX ADMIN — PROPOFOL 120 MCG/KG/MIN: 10 INJECTION, EMULSION INTRAVENOUS at 11:47

## 2025-07-10 RX ADMIN — PROPOFOL 100 MCG/KG/MIN: 10 INJECTION, EMULSION INTRAVENOUS at 12:46

## 2025-07-10 RX ADMIN — FENTANYL CITRATE 25 MCG: 50 INJECTION, SOLUTION INTRAMUSCULAR; INTRAVENOUS at 13:43

## 2025-07-10 RX ADMIN — PHENYLEPHRINE HYDROCHLORIDE 30 MCG/MIN: 10 INJECTION INTRAVENOUS at 11:51

## 2025-07-10 RX ADMIN — PROPOFOL 20 MG: 10 INJECTION, EMULSION INTRAVENOUS at 11:00

## 2025-07-10 RX ADMIN — TRAMADOL HYDROCHLORIDE 50 MG: 50 TABLET, COATED ORAL at 16:55

## 2025-07-10 RX ADMIN — DEXAMETHASONE SODIUM PHOSPHATE 4 MG: 4 INJECTION, SOLUTION INTRAMUSCULAR; INTRAVENOUS at 12:02

## 2025-07-10 RX ADMIN — Medication 120 MCG: at 11:53

## 2025-07-10 RX ADMIN — TRANEXAMIC ACID 1000 MG: 1 INJECTION, SOLUTION INTRAVENOUS at 12:02

## 2025-07-10 RX ADMIN — SODIUM CHLORIDE, PRESERVATIVE FREE 5 ML: 5 INJECTION INTRAVENOUS at 20:20

## 2025-07-10 RX ADMIN — FENTANYL CITRATE 25 MCG: 50 INJECTION INTRAMUSCULAR; INTRAVENOUS at 14:30

## 2025-07-10 RX ADMIN — ACETAMINOPHEN 650 MG: 325 TABLET ORAL at 23:19

## 2025-07-10 RX ADMIN — PROPOFOL 30 MG: 10 INJECTION, EMULSION INTRAVENOUS at 13:21

## 2025-07-10 RX ADMIN — HYDROMORPHONE HYDROCHLORIDE 0.2 MG: 1 INJECTION, SOLUTION INTRAMUSCULAR; INTRAVENOUS; SUBCUTANEOUS at 13:21

## 2025-07-10 RX ADMIN — Medication 3 AMPULE: at 10:36

## 2025-07-10 RX ADMIN — Medication 5 MG: at 12:33

## 2025-07-10 RX ADMIN — TRANEXAMIC ACID 1000 MG: 1 INJECTION, SOLUTION INTRAVENOUS at 13:57

## 2025-07-10 RX ADMIN — TRAMADOL HYDROCHLORIDE 100 MG: 50 TABLET, COATED ORAL at 23:21

## 2025-07-10 RX ADMIN — HYDROMORPHONE HYDROCHLORIDE 0.6 MG: 1 INJECTION, SOLUTION INTRAMUSCULAR; INTRAVENOUS; SUBCUTANEOUS at 14:12

## 2025-07-10 RX ADMIN — SODIUM CHLORIDE, POTASSIUM CHLORIDE, SODIUM LACTATE AND CALCIUM CHLORIDE: 600; 310; 30; 20 INJECTION, SOLUTION INTRAVENOUS at 13:27

## 2025-07-10 RX ADMIN — FENTANYL CITRATE 25 MCG: 50 INJECTION, SOLUTION INTRAMUSCULAR; INTRAVENOUS at 13:51

## 2025-07-10 RX ADMIN — BUPIVACAINE HYDROCHLORIDE 20 ML: 5 INJECTION, SOLUTION EPIDURAL; INTRACAUDAL; PERINEURAL at 11:09

## 2025-07-10 RX ADMIN — DOCUSATE SODIUM AND SENNOSIDES 1 TABLET: 8.6; 5 TABLET, FILM COATED ORAL at 20:19

## 2025-07-10 RX ADMIN — ASPIRIN 81 MG: 81 TABLET, DELAYED RELEASE ORAL at 20:19

## 2025-07-10 RX ADMIN — BUPIVACAINE 20 ML: 13.3 INJECTION, SUSPENSION, LIPOSOMAL INFILTRATION at 11:09

## 2025-07-10 RX ADMIN — ACETAMINOPHEN 1000 MG: 500 TABLET ORAL at 10:26

## 2025-07-10 RX ADMIN — TRAZODONE HYDROCHLORIDE 50 MG: 50 TABLET ORAL at 20:19

## 2025-07-10 RX ADMIN — FAMOTIDINE 20 MG: 20 TABLET, FILM COATED ORAL at 20:19

## 2025-07-10 RX ADMIN — SODIUM CHLORIDE, POTASSIUM CHLORIDE, SODIUM LACTATE AND CALCIUM CHLORIDE 25 ML: 600; 310; 30; 20 INJECTION, SOLUTION INTRAVENOUS at 10:43

## 2025-07-10 RX ADMIN — PHENYLEPHRINE HYDROCHLORIDE 80 MCG: 10 INJECTION INTRAVENOUS at 12:07

## 2025-07-10 RX ADMIN — PHENYLEPHRINE HYDROCHLORIDE 80 MCG: 10 INJECTION INTRAVENOUS at 12:21

## 2025-07-10 RX ADMIN — ACETAMINOPHEN 650 MG: 325 TABLET ORAL at 16:54

## 2025-07-10 RX ADMIN — PROPOFOL 30 MG: 10 INJECTION, EMULSION INTRAVENOUS at 11:09

## 2025-07-10 RX ADMIN — HYDROMORPHONE HYDROCHLORIDE 0.5 MG: 1 INJECTION, SOLUTION INTRAMUSCULAR; INTRAVENOUS; SUBCUTANEOUS at 14:47

## 2025-07-10 RX ADMIN — FENTANYL CITRATE 50 MCG: 50 INJECTION, SOLUTION INTRAMUSCULAR; INTRAVENOUS at 13:27

## 2025-07-10 RX ADMIN — FENTANYL CITRATE 25 MCG: 50 INJECTION INTRAMUSCULAR; INTRAVENOUS at 14:20

## 2025-07-10 RX ADMIN — MEPIVACAINE HYDROCHLORIDE 60 MG: 20 INJECTION, SOLUTION EPIDURAL; INFILTRATION at 11:00

## 2025-07-10 RX ADMIN — WATER 2000 MG: 1 INJECTION INTRAMUSCULAR; INTRAVENOUS; SUBCUTANEOUS at 20:20

## 2025-07-10 ASSESSMENT — PAIN DESCRIPTION - LOCATION
LOCATION: KNEE
LOCATION: KNEE
LOCATION: LEG;KNEE
LOCATION: KNEE;LEG
LOCATION: KNEE
LOCATION: LEG;KNEE
LOCATION: LEG;KNEE
LOCATION: KNEE
LOCATION: LEG

## 2025-07-10 ASSESSMENT — PAIN SCALES - GENERAL
PAINLEVEL_OUTOF10: 5
PAINLEVEL_OUTOF10: 7
PAINLEVEL_OUTOF10: 8
PAINLEVEL_OUTOF10: 5
PAINLEVEL_OUTOF10: 3
PAINLEVEL_OUTOF10: 7
PAINLEVEL_OUTOF10: 5
PAINLEVEL_OUTOF10: 4
PAINLEVEL_OUTOF10: 7
PAINLEVEL_OUTOF10: 6

## 2025-07-10 ASSESSMENT — PAIN DESCRIPTION - ORIENTATION
ORIENTATION: RIGHT

## 2025-07-10 ASSESSMENT — PAIN DESCRIPTION - PAIN TYPE
TYPE: SURGICAL PAIN

## 2025-07-10 ASSESSMENT — PAIN - FUNCTIONAL ASSESSMENT
PAIN_FUNCTIONAL_ASSESSMENT: PREVENTS OR INTERFERES SOME ACTIVE ACTIVITIES AND ADLS
PAIN_FUNCTIONAL_ASSESSMENT: PREVENTS OR INTERFERES SOME ACTIVE ACTIVITIES AND ADLS
PAIN_FUNCTIONAL_ASSESSMENT: 0-10
PAIN_FUNCTIONAL_ASSESSMENT: PREVENTS OR INTERFERES SOME ACTIVE ACTIVITIES AND ADLS
PAIN_FUNCTIONAL_ASSESSMENT: PREVENTS OR INTERFERES SOME ACTIVE ACTIVITIES AND ADLS

## 2025-07-10 ASSESSMENT — PAIN DESCRIPTION - FREQUENCY
FREQUENCY: INTERMITTENT

## 2025-07-10 ASSESSMENT — PAIN DESCRIPTION - DESCRIPTORS
DESCRIPTORS: ACHING
DESCRIPTORS: ACHING;DISCOMFORT
DESCRIPTORS: ACHING;SHARP
DESCRIPTORS: ACHING;DISCOMFORT
DESCRIPTORS: DISCOMFORT
DESCRIPTORS: ACHING;DISCOMFORT
DESCRIPTORS: DULL
DESCRIPTORS: ACHING;DISCOMFORT
DESCRIPTORS: ACHING
DESCRIPTORS: ACHING;DISCOMFORT
DESCRIPTORS: ACHING;DISCOMFORT

## 2025-07-10 ASSESSMENT — PAIN DESCRIPTION - ONSET
ONSET: GRADUAL

## 2025-07-10 NOTE — PLAN OF CARE
Problem: Physical Therapy - Adult  Goal: By Discharge: Performs mobility at highest level of function for planned discharge setting.  See evaluation for individualized goals.  Description: FUNCTIONAL STATUS PRIOR TO ADMISSION: Patient was independent and active without use of DME. Reports 1 recent fall in December. Retired but stays active doing chores around the house.     HOME SUPPORT PRIOR TO ADMISSION: The patient lived with wife who is able to assist as needed.    Physical Therapy Goals  Initiated 7/10/2025  1.  Patient will move from supine to sit and sit to supine, scoot up and down, and roll side to side in bed with independence within 4 day(s).    2.  Patient will perform sit to stand with modified independence within 4 day(s).  3.  Patient will transfer from bed to chair and chair to bed with modified independence using the least restrictive device within 4 day(s).  4.  Patient will ambulate with modified independence for 300 feet with the least restrictive device within 4 day(s).   5.  Patient will ascend/descend 13 stairs with 1 handrail(s) with modified independence within 4 day(s).  6. Patient will perform LE strengthening home exercise program per protocol with independence within 4 days.  7. Patient will demonstrate AROM 0-90 degrees in operative joint within 4 days.     Outcome: Progressing   PHYSICAL THERAPY EVALUATION    Patient: Ajit Gutiérrez (74 y.o. male)  Date: 7/10/2025  Primary Diagnosis: Loosening of prosthesis of right total knee replacement, initial encounter [T84.032A]  Procedure(s) (LRB):  RIGHT KNEE REVISION (Right) * Day of Surgery *   Precautions: Restrictions/Precautions  Restrictions/Precautions:  (WBAT RLE)            ASSESSMENT :   DEFICITS/IMPAIRMENTS:   The patient is limited by altered gait pattern, decreased AROM of R knee, mild complaints of pain, drowsiness, generalized weakness, and impaired functional mobility on POD 0 following R knee revision. Pt tolerated

## 2025-07-10 NOTE — ANESTHESIA PROCEDURE NOTES
Peripheral Block    Patient location during procedure: pre-op  Reason for block: post-op pain management and at surgeon's request  Start time: 7/10/2025 11:09 AM  End time: 7/10/2025 11:15 AM  Staffing  Performed: anesthesiologist   Anesthesiologist: Arnulfo Chiang MD  Performed by: Arnulfo Chiang MD  Authorized by: Arnulfo Chiang MD    Preanesthetic Checklist  Completed: patient identified, IV checked, site marked, risks and benefits discussed, surgical/procedural consents, equipment checked, pre-op evaluation, timeout performed, anesthesia consent given, oxygen available, monitors applied/VS acknowledged and fire risk safety assessment completed and verbalized  Peripheral Block   Patient position: supine  Prep: ChloraPrep  Provider prep: mask and sterile gloves  Patient monitoring: cardiac monitor, continuous pulse ox, frequent blood pressure checks, IV access and oxygen  Block type: Saphenous  Laterality: right  Injection technique: single-shot  Guidance: ultrasound guided  Local infiltration: lidocaine  Local infiltration: lidocaine    Needle   Needle type: insulated echogenic nerve stimulator needle   Needle gauge: 21 G  Needle localization: anatomical landmarks and ultrasound guidance  Needle length: 10 cm  Assessment   Injection assessment: negative aspiration for heme, no paresthesia on injection, local visualized surrounding nerve on ultrasound and no intravascular symptoms  Paresthesia pain: none  Slow fractionated injection: yes  Hemodynamics: stable  Outcomes: uncomplicated and patient tolerated procedure well    Additional Notes  + Right genicular blocks (20cc)  Medications Administered  BUPivacaine (MARCAINE) PF injection 0.5% - Perineural   20 mL - 7/10/2025 11:09:00 AM  BUPivacaine liposome (EXPAREL) injection 1.3% - Perineural   20 mL - 7/10/2025 11:09:00 AM

## 2025-07-10 NOTE — PROGRESS NOTES
Ortho / Neurosurgery PA Note    POD# 0  s/p RIGHT KNEE REVISION   Pt seen with PT    Patient in bed, starting to work with therapy. Discussed course of stay in hospital and discharge criteria. Wife at bedside. All questions answered.     VSS Afebrile.    Visit Vitals  /84   Pulse 79   Temp 97.5 °F (36.4 °C) (Oral)   Resp 15   Ht 1.829 m (6')   Wt 99.6 kg (219 lb 9.3 oz)   SpO2 95%   BMI 29.78 kg/m²       Voiding status: due to void            Labs    Lab Results   Component Value Date/Time    HGB 15.5 06/30/2025 08:06 AM      Lab Results   Component Value Date/Time    INR 1.0 06/30/2025 08:06 AM      Lab Results   Component Value Date/Time     06/30/2025 08:06 AM    K 3.8 06/30/2025 08:06 AM     06/30/2025 08:06 AM    CO2 26 06/30/2025 08:06 AM    BUN 15 06/30/2025 08:06 AM     Recent Glucose Results:   Glucose   Date Value Ref Range Status   06/30/2025 124 (H) 65 - 100 mg/dL Final   12/08/2024 151 (H) 65 - 100 mg/dL Final   05/24/2024 247 (H) 65 - 100 mg/dL Final           Body mass index is 29.78 kg/m². : A BMI > 30 is classified as obesity and > 40 is classified as morbid obesity.     Awake and alert. No acute distress.    Dressing: Ace Wrap and Prevena C.D.I.   No significant erythema or swelling  Cryotherapy in place over incision.   Calves soft and supple; No pain with passive stretch    BLE sensation to light touch intact  BLE motor intact.  +PF/DF/EHL intact Strength 5/5    SCD for mechanical DVT proph while in bed        PLAN:  1) PT  - WBAT RLE.   2) DVT Prophylaxis: Aspirin 81 mg BID   3) GI Prophylaxis - Pepcid  4) Pain control - scheduled tylenol  and toradol, and prn  tramadol    5) Readiness for discharge:     [x] Vital Signs stable    [x] Labs stable    [] + Voiding    [x] Wound intact, Prevena drainage minimal    [x] Tolerating PO intake     [] Cleared by PT (OT if applicable) for discharge   [x] Adequate pain control on oral medication alone        Discharge Plan: Home with Home  Health     Discharge Barriers:   Voiding and PT clearance      Kristine James PA-C

## 2025-07-10 NOTE — PERIOP NOTE
Family   set  up  for text  message.     Pt  denies   exposure to  infectious    disease    that  he knows of.   Pt   voided in  bathroom   x2   in holding  area.   No mepilex applied  as  it  would be in the  spinal  area.    Skin intact.   Pt   administered  tyenol and   celebrex  per orders see  mar.   Pt   ready  and  awaiting     anesthesia  to do   spinal and block

## 2025-07-10 NOTE — ANESTHESIA PROCEDURE NOTES
Spinal Block    Patient location during procedure: pre-op  End time: 7/10/2025 11:08 AM  Reason for block: primary anesthetic  Staffing  Performed: anesthesiologist   Anesthesiologist: Arnulfo Chiang MD  Performed by: Arnulfo Chiang MD  Authorized by: Arnulfo Chiang MD    Spinal Block  Patient position: sitting  Prep: DuraPrep  Patient monitoring: cardiac monitor, continuous pulse ox, frequent blood pressure checks and oxygen  Approach: midline  Location: L4/L5  Provider prep: mask and sterile gloves  Local infiltration: lidocaine  Needle  Needle type: Quincke   Needle gauge: 22 G  Needle length: 4.75 in  Assessment  Sensory level: T4  Events: None  Swirl obtained: Yes  CSF: clear  Attempts: 1  Hemodynamics: stable  Preanesthetic Checklist  Completed: patient identified, IV checked, site marked, risks and benefits discussed, surgical/procedural consents, equipment checked, pre-op evaluation, timeout performed, anesthesia consent given, oxygen available, monitors applied/VS acknowledged and fire risk safety assessment completed and verbalized

## 2025-07-10 NOTE — PROGRESS NOTES
End of Shift Note    Bedside shift change report given to BRITTNEE Lui (oncoming nurse) by Keyanna Fitzpatrick RN (offgoing nurse).  Report included the following information SBAR, Kardex, OR Summary, Procedure Summary, Intake/Output, MAR, Accordion, and Recent Results    Shift worked: Dayshift     Shift summary and any significant changes:    - vitals stable  - up x1 w/walker - prefers urinal  - Tramadol Q6hrs managing pain  - PT/OT did eval - needs to clear to go home w/HH tomorrow     Concerns for physician to address: None     Zone phone for oncoming shift:  [phone]       Activity:  Level of Assistance: Minimal assist, patient does 75% or more  Number times ambulated in hallways past shift: 1  Number of times OOB to chair past shift: 0    Cardiac:   Cardiac Monitoring: No      Cardiac Rhythm: Sinus rhythm    Access:  Current line(s): PIV     Genitourinary:   Urinary Status: Voiding, Bathroom privileges    Respiratory:   O2 Device: None (Room air)  Chronic home O2 use?: NO  Incentive spirometer at bedside: YES    GI:  Last BM (including prior to admit): 07/10/25  Current diet:  ADULT DIET; Regular  Passing flatus: YES    Pain Management:   Patient states pain is manageable on current regimen: YES    Skin:  Gautam Scale Score: 20  Interventions: Wound Offloading (Prevention Methods): Pillows, Repositioning    Patient Safety:  Fall Risk: Nursing Judgement-Fall Risk High(Add Comments): Yes  Fall Risk Interventions  Nursing Judgement-Fall Risk High(Add Comments): Yes  Toilet Every 2 Hours-In Advance of Need: Yes  Hourly Visual Checks: Awake, In bed  Fall Visual Posted: Armband, Fall sign posted, Socks  Room Door Open: Deferred to promote rest  Alarm On: Bed  Patient Moved Closer to Nursing Station: No    Active Consults:   IP CONSULT TO CASE MANAGEMENT    Length of Stay:  Expected LOS:   Actual LOS: 0    Keyanna Fitzpatrick RN

## 2025-07-10 NOTE — H&P
Date of Surgery Update:  Ajit Gutiérrez was seen and examined on the day of surgery prior to the procedure by the surgical team.    There were no significant clinical changes since the completion of the History and Physical.    Exam today prior to surgery showed no acute cardiac findings, no respiratory difficulty, and no abdominal complaints or pain.       Signed By: Arnulfo Caban PA-C    July 10, 2025 10:39 AM

## 2025-07-10 NOTE — PLAN OF CARE
Problem: Safety - Adult  Goal: Free from fall injury  Outcome: Progressing     Problem: ABCDS Injury Assessment  Goal: Absence of physical injury  Outcome: Progressing     Problem: Pain  Goal: Verbalizes/displays adequate comfort level or baseline comfort level  Outcome: Progressing     Problem: Physical Therapy - Adult  Goal: By Discharge: Performs mobility at highest level of function for planned discharge setting.  See evaluation for individualized goals.  Description: FUNCTIONAL STATUS PRIOR TO ADMISSION: Patient was independent and active without use of DME. Reports 1 recent fall in December. Retired but stays active doing chores around the house.     HOME SUPPORT PRIOR TO ADMISSION: The patient lived with wife who is able to assist as needed.    Physical Therapy Goals  Initiated 7/10/2025  1.  Patient will move from supine to sit and sit to supine, scoot up and down, and roll side to side in bed with independence within 4 day(s).    2.  Patient will perform sit to stand with modified independence within 4 day(s).  3.  Patient will transfer from bed to chair and chair to bed with modified independence using the least restrictive device within 4 day(s).  4.  Patient will ambulate with modified independence for 300 feet with the least restrictive device within 4 day(s).   5.  Patient will ascend/descend 13 stairs with 1 handrail(s) with modified independence within 4 day(s).  6. Patient will perform LE strengthening home exercise program per protocol with independence within 4 days.  7. Patient will demonstrate AROM 0-90 degrees in operative joint within 4 days.     7/10/2025 1652 by Amrita Morales, PT  Outcome: Progressing     Problem: Skin/Tissue Integrity - Adult  Goal: Incisions, wounds, or drain sites healing without S/S of infection  Outcome: Progressing

## 2025-07-10 NOTE — ANESTHESIA PRE PROCEDURE
Department of Anesthesiology  Preprocedure Note       Name:  Ajit Gutiérrez   Age:  74 y.o.  :  1950                                          MRN:  [id number]         Date:  7/10/2025      Surgeon: Surgeon(s):  Nato Mittal MD    Procedure: Procedure(s):  RIGHT KNEE REVISION    Medications prior to admission:   Prior to Admission medications    Medication Sig Start Date End Date Taking? Authorizing Provider   traZODone (DESYREL) 50 MG tablet Take 1 tablet by mouth nightly    ProviderRaisa MD   Multiple Vitamins-Minerals (MULTI FOR HIM 50+) TABS Take by mouth daily    ProviderRaisa MD   Cholecalciferol (VITAMIN D-3 PO) Take by mouth daily    ProviderRaisa MD   acetaminophen (TYLENOL) 500 MG tablet Take 1 tablet by mouth every 6 hours as needed for Pain    ProviderRaisa MD   sildenafil (VIAGRA) 25 MG tablet Take 1 tablet by mouth as needed for Erectile Dysfunction    ProviderRaisa MD   butalbital-APAP-caffeine -40 MG CAPS per capsule Take 1 capsule by mouth every 4 hours as needed for Headaches  Patient not taking: Reported on 24   Lita Ferguson DO   albuterol sulfate HFA (PROVENTIL;VENTOLIN;PROAIR) 108 (90 Base) MCG/ACT inhaler Inhale 2 puffs into the lungs every 4 hours as needed    Automatic Reconciliation, Ar   atorvastatin (LIPITOR) 10 MG tablet Take 1 tablet by mouth daily    Automatic Reconciliation, Ar   Azelastine-Fluticasone 137-50 MCG/ACT SUSP by Nasal route as needed    Automatic Reconciliation, Ar   budesonide-formoterol (SYMBICORT) 160-4.5 MCG/ACT AERO Inhale 2 puffs into the lungs 2 times daily as needed    Automatic Reconciliation, Ar   Cetirizine HCl 10 MG CAPS Take by mouth daily    Automatic Reconciliation, Ar   hydroCHLOROthiazide (HYDRODIURIL) 25 MG tablet Take 1 tablet by mouth daily    Automatic Reconciliation, Ar   lisinopril (PRINIVIL;ZESTRIL) 10 MG tablet Take 1 tablet by mouth daily    Automatic Reconciliation,

## 2025-07-11 VITALS
RESPIRATION RATE: 18 BRPM | HEIGHT: 72 IN | BODY MASS INDEX: 29.77 KG/M2 | TEMPERATURE: 97.9 F | HEART RATE: 69 BPM | DIASTOLIC BLOOD PRESSURE: 74 MMHG | WEIGHT: 219.8 LBS | SYSTOLIC BLOOD PRESSURE: 110 MMHG | OXYGEN SATURATION: 95 %

## 2025-07-11 PROBLEM — Z96.659: Status: ACTIVE | Noted: 2025-07-11

## 2025-07-11 PROBLEM — T84.038A: Status: ACTIVE | Noted: 2025-07-11

## 2025-07-11 LAB
ANION GAP SERPL CALC-SCNC: 5 MMOL/L (ref 2–12)
BUN SERPL-MCNC: 18 MG/DL (ref 6–20)
BUN/CREAT SERPL: 16 (ref 12–20)
CALCIUM SERPL-MCNC: 8.9 MG/DL (ref 8.5–10.1)
CHLORIDE SERPL-SCNC: 104 MMOL/L (ref 97–108)
CO2 SERPL-SCNC: 27 MMOL/L (ref 21–32)
CREAT SERPL-MCNC: 1.11 MG/DL (ref 0.7–1.3)
GLUCOSE SERPL-MCNC: 126 MG/DL (ref 65–100)
HCT VFR BLD AUTO: 41.5 % (ref 36.6–50.3)
HGB BLD-MCNC: 13.3 G/DL (ref 12.1–17)
POTASSIUM SERPL-SCNC: 3.9 MMOL/L (ref 3.5–5.1)
SODIUM SERPL-SCNC: 136 MMOL/L (ref 136–145)

## 2025-07-11 PROCEDURE — 80048 BASIC METABOLIC PNL TOTAL CA: CPT

## 2025-07-11 PROCEDURE — 85014 HEMATOCRIT: CPT

## 2025-07-11 PROCEDURE — 6360000002 HC RX W HCPCS: Performed by: PHYSICIAN ASSISTANT

## 2025-07-11 PROCEDURE — 2500000003 HC RX 250 WO HCPCS: Performed by: PHYSICIAN ASSISTANT

## 2025-07-11 PROCEDURE — 97530 THERAPEUTIC ACTIVITIES: CPT

## 2025-07-11 PROCEDURE — 85018 HEMOGLOBIN: CPT

## 2025-07-11 PROCEDURE — 1100000000 HC RM PRIVATE

## 2025-07-11 PROCEDURE — 97116 GAIT TRAINING THERAPY: CPT

## 2025-07-11 PROCEDURE — 36415 COLL VENOUS BLD VENIPUNCTURE: CPT

## 2025-07-11 PROCEDURE — APPNB180 APP NON BILLABLE TIME > 60 MINS: Performed by: PHYSICIAN ASSISTANT

## 2025-07-11 PROCEDURE — 6370000000 HC RX 637 (ALT 250 FOR IP): Performed by: PHYSICIAN ASSISTANT

## 2025-07-11 RX ORDER — ASPIRIN 81 MG/1
81 TABLET ORAL 2 TIMES DAILY
Qty: 60 TABLET | Refills: 0 | Status: SHIPPED | OUTPATIENT
Start: 2025-07-11 | End: 2025-08-10

## 2025-07-11 RX ORDER — FAMOTIDINE 20 MG/1
20 TABLET, FILM COATED ORAL 2 TIMES DAILY
Qty: 60 TABLET | Refills: 0 | Status: SHIPPED | OUTPATIENT
Start: 2025-07-11 | End: 2025-08-10

## 2025-07-11 RX ORDER — TRAMADOL HYDROCHLORIDE 50 MG/1
50 TABLET ORAL EVERY 6 HOURS PRN
Qty: 28 TABLET | Refills: 0 | Status: SHIPPED | OUTPATIENT
Start: 2025-07-11 | End: 2025-07-18

## 2025-07-11 RX ORDER — MYCOPHENOLATE MOFETIL 500 MG/1
1000 TABLET ORAL 2 TIMES DAILY
Qty: 60 TABLET | Refills: 3 | Status: SHIPPED
Start: 2025-07-24

## 2025-07-11 RX ADMIN — POLYETHYLENE GLYCOL 3350 17 G: 17 POWDER, FOR SOLUTION ORAL at 08:26

## 2025-07-11 RX ADMIN — TRAMADOL HYDROCHLORIDE 50 MG: 50 TABLET, COATED ORAL at 12:00

## 2025-07-11 RX ADMIN — ACETAMINOPHEN 650 MG: 325 TABLET ORAL at 04:07

## 2025-07-11 RX ADMIN — DOCUSATE SODIUM AND SENNOSIDES 1 TABLET: 8.6; 5 TABLET, FILM COATED ORAL at 08:26

## 2025-07-11 RX ADMIN — FAMOTIDINE 20 MG: 20 TABLET, FILM COATED ORAL at 08:26

## 2025-07-11 RX ADMIN — ATORVASTATIN CALCIUM 10 MG: 10 TABLET, FILM COATED ORAL at 08:26

## 2025-07-11 RX ADMIN — ASPIRIN 81 MG: 81 TABLET, DELAYED RELEASE ORAL at 08:26

## 2025-07-11 RX ADMIN — SODIUM CHLORIDE, PRESERVATIVE FREE 10 ML: 5 INJECTION INTRAVENOUS at 08:27

## 2025-07-11 RX ADMIN — WATER 2000 MG: 1 INJECTION INTRAMUSCULAR; INTRAVENOUS; SUBCUTANEOUS at 04:08

## 2025-07-11 RX ADMIN — TRAMADOL HYDROCHLORIDE 50 MG: 50 TABLET, COATED ORAL at 08:26

## 2025-07-11 RX ADMIN — KETOROLAC TROMETHAMINE 15 MG: 30 INJECTION, SOLUTION INTRAMUSCULAR at 12:00

## 2025-07-11 RX ADMIN — KETOROLAC TROMETHAMINE 15 MG: 30 INJECTION, SOLUTION INTRAMUSCULAR at 04:08

## 2025-07-11 RX ADMIN — ACETAMINOPHEN 650 MG: 325 TABLET ORAL at 12:00

## 2025-07-11 ASSESSMENT — PAIN DESCRIPTION - LOCATION
LOCATION: KNEE

## 2025-07-11 ASSESSMENT — PAIN DESCRIPTION - DESCRIPTORS
DESCRIPTORS: ACHING;DISCOMFORT
DESCRIPTORS: ACHING;DISCOMFORT
DESCRIPTORS: DISCOMFORT

## 2025-07-11 ASSESSMENT — PAIN DESCRIPTION - PAIN TYPE
TYPE: SURGICAL PAIN

## 2025-07-11 ASSESSMENT — PAIN SCALES - GENERAL
PAINLEVEL_OUTOF10: 3
PAINLEVEL_OUTOF10: 2
PAINLEVEL_OUTOF10: 2
PAINLEVEL_OUTOF10: 4
PAINLEVEL_OUTOF10: 0

## 2025-07-11 ASSESSMENT — PAIN - FUNCTIONAL ASSESSMENT
PAIN_FUNCTIONAL_ASSESSMENT: PREVENTS OR INTERFERES SOME ACTIVE ACTIVITIES AND ADLS

## 2025-07-11 ASSESSMENT — PAIN DESCRIPTION - FREQUENCY
FREQUENCY: INTERMITTENT

## 2025-07-11 ASSESSMENT — PAIN DESCRIPTION - ORIENTATION
ORIENTATION: RIGHT

## 2025-07-11 ASSESSMENT — PAIN DESCRIPTION - ONSET
ONSET: GRADUAL

## 2025-07-11 NOTE — ANESTHESIA POSTPROCEDURE EVALUATION
Department of Anesthesiology  Postprocedure Note    Patient: Ajit Gutiérrez  MRN: [id number]  YOB: 1950  Date of evaluation: 7/11/2025    Procedure Summary       Date: 07/10/25 Room / Location: [facility] MAIN OR  / 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, initial encounter [T84.032A])    Providers: Nato Mittal MD Responsible Provider: Arnulfo Chiang MD    Anesthesia Type: MAC, Spinal ASA Status: 3            Anesthesia Type: MAC, Spinal    Deana Phase I: Deana Score: 9    Deana Phase II:      Anesthesia Post Evaluation    Patient location during evaluation: PACU  Patient participation: complete - patient participated  Level of consciousness: sleepy but conscious  Airway patency: patent  Nausea & Vomiting: no nausea and no vomiting  Cardiovascular status: hemodynamically stable  Respiratory status: acceptable and nasal cannula  Hydration status: stable  Multimodal analgesia pain management approach        There were no known notable events for this encounter.

## 2025-07-11 NOTE — PROGRESS NOTES
Update: patient states does not have a RW for use at home.  RW ordered through [employer] [place].    Patient has RW for home use.  Spouse at bedside.    Discussed the importance of using pain medication and other methods for pain control such as ice/rest/elevate, pre-medicating prior to physical therapy.  Discussed the importance of being safe at hospital and home by using RW until cleared by PT, wearing safe shoes, preparing home for after surgery and having a  to help at home.  Discussed the importance of home PT, daily exercises as instructed by physical therapist and post-op appointment with surgeon and the need for transportation to this appointment until the surgeon has cleared you for driving.    Discussed risk after surgery and the importance of preventing infection by good hand hygiene, using clean towels and wash cloths at each shower, inspect wound/dressing daily, moving every two hours while awake, using the incentive spirometer every hour while awake.  When to call the doctor for help, or when to call 911 for emergency.  Opportunity given for patient/family to ask additional questions about any special concerns regarding your care while on the Ortho unit and preparing for discharge.

## 2025-07-11 NOTE — DISCHARGE SUMMARY
Ortho Discharge Summary    Patient ID:  Ajit Gutiérrez  [id number]  male  74 y.o.  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    PMH:   Past Medical History:   Diagnosis Date    Adverse effect of anesthesia     have to be particular with type of anesthesia d/t myasthenia gravis    Anxiety     Asthma     Elevated cholesterol     History of gastric ulcer     resolved    History of kidney stones     Hypertension     Ileus, postoperative (HCC)     Ill-defined condition     elevated cholesterol    Myasthenia gravis (HCC)     Pleural plaque due to asbestos exposure     Seasonal allergic rhinitis     Sleep apnea     mild, no cpap       Body mass index is 29.78 kg/m². : A BMI > 30 is classified as obesity and > 40 is classified as morbid obesity.     Medications upon admission :   Prior to Admission Medications   Prescriptions Last Dose Informant Patient Reported? Taking?   Azelastine-Fluticasone 137-50 MCG/ACT SUSP Not Taking  Yes No   Sig: by Nasal route as needed   Patient not taking: Reported on 7/10/2025   Cetirizine HCl 10 MG CAPS 7/10/2025 at  6:30 AM  Yes  are unsure what to do until then, ask your doctor or other care provider.            CONTINUE taking these medications      atorvastatin 10 MG tablet  Commonly known as: LIPITOR     Cetirizine HCl 10 MG Caps     hydroCHLOROthiazide 25 MG tablet  Commonly known as: HYDRODIURIL     lisinopril 10 MG tablet  Commonly known as: PRINIVIL;ZESTRIL     meloxicam 7.5 MG tablet  Commonly known as: MOBIC     Multi For Him 50+ Tabs     traZODone 50 MG tablet  Commonly known as: DESYREL     Viagra 25 MG tablet  Generic drug: sildenafil     VITAMIN D-3 PO            STOP taking these medications      butalbital-APAP-caffeine -40 MG Caps per capsule     simethicone 80 MG chewable tablet  Commonly known as: MYLICON            ASK your doctor about these medications      acetaminophen 500 MG tablet  Commonly known as: TYLENOL     albuterol sulfate  (90 Base) MCG/ACT inhaler  Commonly known as: PROVENTIL;VENTOLIN;PROAIR     Azelastine-Fluticasone 137-50 MCG/ACT Susp     Symbicort 160-4.5 MCG/ACT Aero  Generic drug: budesonide-formoterol               Where to Get Your Medications        These medications were sent to [id number] - [place] - [address] - P [phone] - F [phone]  [address] [place] [address]      Phone: [phone]   aspirin 81 MG EC tablet  famotidine 20 MG tablet  traMADol 50 MG tablet       Information about where to get these medications is not yet available    Ask your nurse or doctor about these medications  mycophenolate 500 MG tablet      per medical continuation form      -Follow up in office in 1 weeks      Signed:  Kristine James PA-C    Orthopaedic Nurse Practitioner    7/11/2025  10:45 AM

## 2025-07-11 NOTE — CARE COORDINATION
1208 - [facility] has accepted with SOC reported for tomorrow. AVS updated.    Initial note - Met with pt and wife Janine at bedside to review discharge plan. Pt and Janine reported that pt may start outpatient PT in the second half of next week, would like home health PT visits for home assessment and in-home activity practice prior to starting outpatient. CM spoke with Ortho PA, who voiced support for pt and Janine's preferred plan. Notified pt and Janine that Ortho team in agreement with HH followed by OPPT, discussed choice, Janine voiced preference for [facility]. CM sending referral according to preference.    Tanja Teague, [facility]  [phone]

## 2025-07-11 NOTE — PLAN OF CARE
Problem: Safety - Adult  Goal: Free from fall injury  Outcome: Progressing     Problem: ABCDS Injury Assessment  Goal: Absence of physical injury  Outcome: Progressing  Flowsheets (Taken 7/10/2025 2258 by Janet Fonseca RN)  Absence of Physical Injury: Implement safety measures based on patient assessment     Problem: Pain  Goal: Verbalizes/displays adequate comfort level or baseline comfort level  Outcome: Progressing     Problem: Skin/Tissue Integrity - Adult  Goal: Incisions, wounds, or drain sites healing without S/S of infection  Outcome: Progressing  Flowsheets (Taken 7/10/2025 2021 by Janet Fonseca RN)  Incisions, Wounds, or Drain Sites Healing Without Sign and Symptoms of Infection:   TWICE DAILY: Assess and document dressing/incision, wound bed, drain sites and surrounding tissue   TWICE DAILY: Assess and document skin integrity

## 2025-07-11 NOTE — PROGRESS NOTES
Ortho / Neurosurgery PA Note    POD# 1  s/p RIGHT KNEE REVISION   Pt seen with wife    Patient in chair after working with therapy. Discussed surgery and answered their questions. Reviewed medications. Denies CP/SOB/NV    VSS Afebrile.    Visit Vitals  /62   Pulse 67   Temp 97.9 °F (36.6 °C) (Oral)   Resp 18   Ht 1.829 m (6')   Wt 99.6 kg (219 lb 9.3 oz)   SpO2 95%   BMI 29.78 kg/m²       Voiding status: voiding independently            Labs    Lab Results   Component Value Date/Time    HGB 13.3 07/11/2025 04:58 AM      Lab Results   Component Value Date/Time    INR 1.0 06/30/2025 08:06 AM      Lab Results   Component Value Date/Time     07/11/2025 04:58 AM    K 3.9 07/11/2025 04:58 AM     07/11/2025 04:58 AM    CO2 27 07/11/2025 04:58 AM    BUN 18 07/11/2025 04:58 AM     Recent Glucose Results:   Glucose   Date Value Ref Range Status   07/11/2025 126 (H) 65 - 100 mg/dL Final   06/30/2025 124 (H) 65 - 100 mg/dL Final   12/08/2024 151 (H) 65 - 100 mg/dL Final           Body mass index is 29.78 kg/m². : A BMI > 30 is classified as obesity and > 40 is classified as morbid obesity.     Awake and alert. No acute distress.    Dressing: Prevena C.D.I.   No significant erythema or swelling  Cryotherapy in place over incision.   Calves soft and supple; No pain with passive stretch    BLE sensation to light touch intact  BLE motor intact.  +PF/DF/EHL intact Strength 5/5    SCD for mechanical DVT proph while in bed        PLAN:  1) PT  - WBAT RLE.   2) DVT Prophylaxis: Aspirin 81 mg BID   3) GI Prophylaxis - Pepcid  4) Pain control - scheduled tylenol and prn  tramadol    5) Readiness for discharge:     [x] Vital Signs stable    [x] Labs stable    [x] + Voiding    [x] Wound intact, Prevena with no drainage     [x] Tolerating PO intake     [x] Cleared by PT (OT if applicable) for discharge   [x] Adequate pain control on oral medication alone        Discharge Plan: Home with Outpatient PT     Discharge  Barriers:   Home today       Kristine James PA-C

## 2025-07-11 NOTE — PLAN OF CARE
Problem: Physical Therapy - Adult  Goal: By Discharge: Performs mobility at highest level of function for planned discharge setting.  See evaluation for individualized goals.  Description: FUNCTIONAL STATUS PRIOR TO ADMISSION: Patient was independent and active without use of DME. Reports 1 recent fall in December. Retired but stays active doing chores around the house.     HOME SUPPORT PRIOR TO ADMISSION: The patient lived with wife who is able to assist as needed.    Physical Therapy Goals  Initiated 7/10/2025  1.  Patient will move from supine to sit and sit to supine, scoot up and down, and roll side to side in bed with independence within 4 day(s).    2.  Patient will perform sit to stand with modified independence within 4 day(s).  3.  Patient will transfer from bed to chair and chair to bed with modified independence using the least restrictive device within 4 day(s).  4.  Patient will ambulate with modified independence for 300 feet with the least restrictive device within 4 day(s).   5.  Patient will ascend/descend 13 stairs with 1 handrail(s) with modified independence within 4 day(s).  6. Patient will perform LE strengthening home exercise program per protocol with independence within 4 days.  7. Patient will demonstrate AROM 0-90 degrees in operative joint within 4 days.     Outcome: Adequate for Discharge   PHYSICAL THERAPY TREATMENT/DISCHARGE    Patient: Ajit Gutiérrez (74 y.o. 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 Patient received resting in bed, agreeable and cleared for therapy. Patient required overall mod I for all functional  mobility. He ambulated in hallway with safe and steady gait with RW. He negotiated 12 stairs with one sided handrail and performed car transfer with no concerns. Wife present and very supportive throughout session. Educated patient on ice application, HEP and answered all questions.     Patient is cleared for discharge from PT standpoint:  YES [x]     NO []          PLAN:  Maximum therapeutic benefit has been met at current level of care and patient will be discharged from physical therapy at this time.    Rationale for discharge:  Goals achieved    Recommendation for discharge: (in order for the patient to meet his/her long term goals):   Intermittent physical therapy up to 2-3x/week in previous living setting    Other factors to consider for discharge: no additional factors    IF patient discharges home will need the following DME: rolling walker    Ajit Gutiérrez was assessed today and a DME order was entered for a rolling walker because he requires this to successfully complete daily living tasks of ambulating. The patient has a mobility limitation that significantly impairs their ability to participate in one or more mobility-related activities of daily living in the home. The patient is able to safely use the walker. The functional mobility deficit can be sufficiently resolved by use of a walker. The need for this equipment was discussed with the patient and he understands and is in agreement.          SUBJECTIVE:   Patient stated, \"So should I sit like this for how long?\"    OBJECTIVE DATA SUMMARY:   Critical Behavior:          Functional Mobility Training:  Bed Mobility:  Bed Mobility Training  Overall Level of Assistance: Modified independent  Rolling: Modified independent  Scooting: Modified independent  Transfers:  Transfer Training  Transfer Training: Yes  Overall Level of Assistance: Modified independent  Sit to Stand: Modified independent  Stand to Sit: Modified independent  Bed to Chair:  Modified independent  Car Transfer: Modified independent  Balance:  Balance  Sitting: Intact  Standing: Intact;With support   Ambulation/Gait Training:     Gait  Gait Training: Yes  Overall Level of Assistance: Modified independent  Distance (ft): 200 Feet  Assistive Device: Walker, rolling;Gait belt  Base of Support: Widened  Speed/Lexie: Pace decreased (< 100 feet/min)  Gait Abnormalities: Antalgic  Rail Use: Both (alternated rails)  Stairs - Level of Assistance: Modified independent  Number of Stairs Trained: 12                                                                                                                                                                                                                                      Intervention/Education specific to: \"knee replacement\"    Education provided to avoid resting in external rotation or knee flexion while in bed. Discussed avoidance of resting with a pillow under the knee but that a pillow from calf to heel is acceptable for short periods if it supports knee extension. Encouraged use of cryo therapy to aide in pain and edema control.              Therapeutic Exercises:     EXERCISE   Sets   Reps   Active Active Assist   Passive Self ROM   Comments   Ankle Pumps 1 10 [x]                                        []                                        []                                        []                                           Quad Sets 1 10 [x]                                        []                                        []                                        []                                           Hamstring Sets 1 10 [x]                                        []                                        []                                        []                                           Short Arc Quads 1 10 [x]                                        []                                        []

## 2025-07-15 ENCOUNTER — POST-OP TELEPHONE (OUTPATIENT)
Facility: HOSPITAL | Age: 75
End: 2025-07-15

## 2025-07-17 NOTE — OP NOTE
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, initial encounter [T84.032A]    Post-Op Diagnosis: Same       Procedure(s):  RIGHT KNEE REVISION    Surgeon(s):  Nato Mittal MD    Assistant:  ESTER Talbert (Performing all or most of the following assistant-at-surgery services including but not limited to: proper patient positioning, sterile/prep and draping, placement of instruments/trackers, operative exposure, minor portions of bone / soft tissue excision, final irrigation and debridement, deep and superficial closure, application of final dressings)    Anesthesia: Spinal    Estimated Blood Loss (mL): Minimal    Complications: None    Specimens:   * No specimens in log *    Implants:  Implant Name Type Inv. Item Serial No.  Lot No. LRB No. Used Action   STEM FEM L110MM [id number] KNEE TI ALLY CRSS SLT PRESSFIT REV - [id number]  STEM FEM L110MM [id number] KNEE TI ALLY CRSS SLT PRESSFIT REV  [facility] [employer] [facility] [id number] Right 1 Implanted   SLEEVE FEM 37MM LATERAL/MEDIAL FULL COAT REV PORCOAT ATTUNE - [id number]  SLEEVE FEM 37MM LATERAL/MEDIAL FULL COAT REV PORCOAT ATTUNE  [facility] [id number] Right 1 Implanted   BASEPLATE TIB SZ 6 ROT PLATFRM CO CHROM MOLYBDENUM TI ALLY - [id number]  BASEPLATE TIB SZ 6 ROT PLATFRM CO CHROM MOLYBDENUM TI ALLY  [facility] [employer] [facility]  Right 1 Implanted   COMPONENT FEM SZ 8 R KNEE CO CHROM MOLYBDENUM DERECK W/ ASYM - [id number]  COMPONENT FEM SZ 8 R KNEE CO CHROM MOLYBDENUM DERECK W/ ASYM  [facility] [id number] Right 1 Implanted   SLEEVE FEM [id number] KNEE TI ALLY FULL POR COAT REV ATTUNE - [id number]  SLEEVE FEM [id number] KNEE TI ALLY FULL POR COAT REV ATTUNE  [facility] [employer] [facility] [id number] Right 1 Implanted   STEM FEM L110MM [id number] KNEE TI

## (undated) DEVICE — Device

## (undated) DEVICE — BAG SPEC BIOHZRD 10 X 10 IN --

## (undated) DEVICE — CONTAINER SPEC 20 ML LID NEUT BUFF FORMALIN 10 % POLYPR STS

## (undated) DEVICE — SET ADMIN 16ML TBNG L100IN 2 Y INJ SITE IV PIGGY BK DISP

## (undated) DEVICE — SUTURE ABSRB L30CM 2-0 VLT SPRL PDS + STRATAFIX SXPP1B410

## (undated) DEVICE — SOLIDIFIER MEDC 1200ML -- CONVERT TO 356117

## (undated) DEVICE — NEEDLE HYPO 18GA L1.5IN PNK S STL HUB POLYPR SHLD REG BVL

## (undated) DEVICE — BLOCK BITE ENDOSCP AD 21 MM W/ DIL BLU LF DISP

## (undated) DEVICE — KENDALL RADIOLUCENT FOAM MONITORING ELECTRODE RECTANGULAR SHAPE: Brand: KENDALL

## (undated) DEVICE — CATH IV AUTOGRD BC PNK 20GA 25 -- INSYTE

## (undated) DEVICE — SYR 10ML LUER LOK 1/5ML GRAD --

## (undated) DEVICE — Z DISCONTINUED PER MEDLINE LINE GAS SAMPLING O2/CO2 LNG AD 13 FT NSL W/ TBNG FILTERLINE

## (undated) DEVICE — BASIN EMSIS 16OZ GRAPHITE PLAS KID SHP MOLD GRAD FOR ORAL

## (undated) DEVICE — NEONATAL-ADULT SPO2 SENSOR: Brand: NELLCOR

## (undated) DEVICE — TOWEL 4 PLY TISS 19X30 SUE WHT

## (undated) DEVICE — FORCEPS BX L160CM DIA8MM GRSP DISECT CUP TIP NONLOCKING ROT

## (undated) DEVICE — APPLICATOR MEDICATED 26 CC SOLUTION HI LT ORNG CHLORAPREP

## (undated) DEVICE — 60-7070-105 TRNQT,DPSB,PLC BLUE: Brand: MEDLINE RENEWAL

## (undated) DEVICE — PREP KIT PEEL PTCH POVIDONE IOD

## (undated) DEVICE — SUTURE ABSORBABLE MONOFILAMENT 1 CTX 36 CM 48 MM VIO PDS +

## (undated) DEVICE — MEDI-VAC YANK SUCT HNDL W/TPRD BULBOUS TIP: Brand: CARDINAL HEALTH

## (undated) DEVICE — Device: Brand: BALLOON3

## (undated) DEVICE — BANDAGE COMPR M W6INXL10YD WHT BGE VELC E MTRX HK AND LOOP

## (undated) DEVICE — Device: Brand: SINGLE USE ASPIRATION NEEDLE

## (undated) DEVICE — ELECTRODE,RADIOTRANSLUCENT,FOAM,5PK: Brand: MEDLINE

## (undated) DEVICE — 1200 GUARD II KIT W/5MM TUBE W/O VAC TUBE: Brand: GUARDIAN

## (undated) DEVICE — SOLIDIFIER FLD 2OZ 1500CC N DISINF IN BTL DISP SAFESORB

## (undated) DEVICE — FORCEPS BX L240CM JAW DIA2.8MM L CAP W/ NDL MIC MESH TOOTH

## (undated) DEVICE — CUFF ADULT 1 PC 1 VINYL DISP --

## (undated) DEVICE — TRAP SUC MUCOUS 70ML -- MEDICHOICE MEDLINE

## (undated) DEVICE — SYR 3ML LL TIP 1/10ML GRAD --

## (undated) DEVICE — 450 ML BOTTLE OF 0.05% CHLORHEXIDINE GLUCONATE IN 99.95% STERILE WATER FOR IRRIGATION, USP AND APPLICATOR.: Brand: IRRISEPT ANTIMICROBIAL WOUND LAVAGE

## (undated) DEVICE — TRAP SPEC COLL POLYP POLYSTYR --

## (undated) DEVICE — SYSTEM WND THER 14 DAY 150 CC CANSTR THER UNIT PREVENA + 125

## (undated) DEVICE — GLOVE ORTHO 8   MSG9480

## (undated) DEVICE — HYPODERMIC SAFETY NEEDLE: Brand: MAGELLAN

## (undated) DEVICE — SOLUTION IRRIG 1000ML H2O PIC PLAS SHATTERPROOF CONTAINER

## (undated) DEVICE — SNARE ENDOSCP M L240CM W27MM SHTH DIA2.4MM CHN 2.8MM OVL

## (undated) DEVICE — GLOVE SURG SZ 85 L12IN FNGR THK79MIL GRN LTX FREE

## (undated) DEVICE — DRAPE,ORTHOMAX,EXTREMITY: Brand: MEDLINE

## (undated) DEVICE — CLIP INT L235CM WRK CHAN DIA2.8MM OPN 11MM LCK MECHANISM MR

## (undated) DEVICE — ATTUNE KNEE SYSTEM REVISION POSTERIOR FEMORAL AUGMENT 4MM CEMENTED SIZE 8
Type: IMPLANTABLE DEVICE | Site: KNEE | Status: NON-FUNCTIONAL
Brand: ATTUNE
Removed: 2025-07-10

## (undated) DEVICE — YANKAUER,TAPERED BULBOUS TIP,W/O VENT: Brand: MEDLINE

## (undated) DEVICE — GLOVE SURG SZ 8 L12IN FNGR THK79MIL GRN LTX FREE

## (undated) DEVICE — STRYKER PERFORMANCE SERIES SAGITTAL BLADE: Brand: STRYKER PERFORMANCE SERIES

## (undated) DEVICE — SUTURE VICRYL SZ 1 L36IN ABSRB UD L36MM CT-1 1/2 CIR J947H

## (undated) DEVICE — SYRINGE 20ML LL S/C 50

## (undated) DEVICE — CATH IV AUTOGRD BC BLU 22GA 25 -- INSYTE

## (undated) DEVICE — TRANSFER SET 3": Brand: MEDLINE INDUSTRIES, INC.

## (undated) DEVICE — NEEDLE HYPO 18 GAX1.5 IN REG SAFETY PLAS HUB PNK DISP

## (undated) DEVICE — TOTAL JOINT-MRMC: Brand: MEDLINE INDUSTRIES, INC.

## (undated) DEVICE — 3M™ IOBAN™ 2 ANTIMICROBIAL INCISE DRAPE 6650EZ: Brand: IOBAN™ 2

## (undated) DEVICE — HANDPIECE SET WITH COAXIAL HIGH FLOW TIP AND SUCTION TUBE: Brand: INTERPULSE

## (undated) DEVICE — SOL IRR SOD CHL 0.9% TITAN XL CNTNR 3000ML

## (undated) DEVICE — SOL IRR STRL H2O 1000ML BTL --